# Patient Record
Sex: MALE | Race: OTHER | NOT HISPANIC OR LATINO
[De-identification: names, ages, dates, MRNs, and addresses within clinical notes are randomized per-mention and may not be internally consistent; named-entity substitution may affect disease eponyms.]

---

## 2020-04-26 ENCOUNTER — MESSAGE (OUTPATIENT)
Age: 50
End: 2020-04-26

## 2020-05-18 ENCOUNTER — APPOINTMENT (OUTPATIENT)
Dept: OPHTHALMOLOGY | Facility: CLINIC | Age: 50
End: 2020-05-18

## 2020-08-21 ENCOUNTER — EMERGENCY (EMERGENCY)
Facility: HOSPITAL | Age: 50
LOS: 1 days | Discharge: HOME | End: 2020-08-21
Attending: EMERGENCY MEDICINE
Payer: COMMERCIAL

## 2020-08-21 VITALS
HEART RATE: 76 BPM | OXYGEN SATURATION: 98 % | SYSTOLIC BLOOD PRESSURE: 132 MMHG | TEMPERATURE: 99 F | RESPIRATION RATE: 16 BRPM | DIASTOLIC BLOOD PRESSURE: 82 MMHG

## 2020-08-21 VITALS
HEART RATE: 118 BPM | TEMPERATURE: 99 F | SYSTOLIC BLOOD PRESSURE: 168 MMHG | WEIGHT: 192.02 LBS | HEIGHT: 70 IN | RESPIRATION RATE: 20 BRPM | DIASTOLIC BLOOD PRESSURE: 95 MMHG | OXYGEN SATURATION: 97 %

## 2020-08-21 LAB
ALBUMIN SERPL ELPH-MCNC: 4.4 G/DL — SIGNIFICANT CHANGE UP (ref 3.3–5)
ALP SERPL-CCNC: 91 U/L — SIGNIFICANT CHANGE UP (ref 40–120)
ALT FLD-CCNC: 36 U/L — SIGNIFICANT CHANGE UP (ref 10–45)
ANION GAP SERPL CALC-SCNC: 13 MMOL/L — SIGNIFICANT CHANGE UP (ref 5–17)
APPEARANCE UR: CLEAR — SIGNIFICANT CHANGE UP
APTT BLD: 34.8 SEC — SIGNIFICANT CHANGE UP (ref 27.5–35.5)
AST SERPL-CCNC: 26 U/L — SIGNIFICANT CHANGE UP (ref 10–40)
BACTERIA # UR AUTO: NEGATIVE — SIGNIFICANT CHANGE UP
BASOPHILS # BLD AUTO: 0.03 K/UL — SIGNIFICANT CHANGE UP (ref 0–0.2)
BASOPHILS NFR BLD AUTO: 0.3 % — SIGNIFICANT CHANGE UP (ref 0–2)
BILIRUB SERPL-MCNC: 0.6 MG/DL — SIGNIFICANT CHANGE UP (ref 0.2–1.2)
BILIRUB UR-MCNC: NEGATIVE — SIGNIFICANT CHANGE UP
BUN SERPL-MCNC: 12 MG/DL — SIGNIFICANT CHANGE UP (ref 7–23)
CALCIUM SERPL-MCNC: 10.1 MG/DL — SIGNIFICANT CHANGE UP (ref 8.4–10.5)
CHLORIDE SERPL-SCNC: 98 MMOL/L — SIGNIFICANT CHANGE UP (ref 96–108)
CO2 SERPL-SCNC: 25 MMOL/L — SIGNIFICANT CHANGE UP (ref 22–31)
COLOR SPEC: COLORLESS — SIGNIFICANT CHANGE UP
CREAT SERPL-MCNC: 1.01 MG/DL — SIGNIFICANT CHANGE UP (ref 0.5–1.3)
DIFF PNL FLD: NEGATIVE — SIGNIFICANT CHANGE UP
EOSINOPHIL # BLD AUTO: 0.02 K/UL — SIGNIFICANT CHANGE UP (ref 0–0.5)
EOSINOPHIL NFR BLD AUTO: 0.2 % — SIGNIFICANT CHANGE UP (ref 0–6)
EPI CELLS # UR: 0 /HPF — SIGNIFICANT CHANGE UP
GAS PNL BLDV: SIGNIFICANT CHANGE UP
GLUCOSE SERPL-MCNC: 184 MG/DL — HIGH (ref 70–99)
GLUCOSE UR QL: NEGATIVE — SIGNIFICANT CHANGE UP
HCT VFR BLD CALC: 41 % — SIGNIFICANT CHANGE UP (ref 39–50)
HGB BLD-MCNC: 13.1 G/DL — SIGNIFICANT CHANGE UP (ref 13–17)
IMM GRANULOCYTES NFR BLD AUTO: 0.8 % — SIGNIFICANT CHANGE UP (ref 0–1.5)
INR BLD: 1.1 RATIO — SIGNIFICANT CHANGE UP (ref 0.88–1.16)
KETONES UR-MCNC: NEGATIVE — SIGNIFICANT CHANGE UP
LEUKOCYTE ESTERASE UR-ACNC: NEGATIVE — SIGNIFICANT CHANGE UP
LIDOCAIN IGE QN: 35 U/L — SIGNIFICANT CHANGE UP (ref 7–60)
LYMPHOCYTES # BLD AUTO: 1.75 K/UL — SIGNIFICANT CHANGE UP (ref 1–3.3)
LYMPHOCYTES # BLD AUTO: 17 % — SIGNIFICANT CHANGE UP (ref 13–44)
MAGNESIUM SERPL-MCNC: 2.3 MG/DL — SIGNIFICANT CHANGE UP (ref 1.6–2.6)
MCHC RBC-ENTMCNC: 25.6 PG — LOW (ref 27–34)
MCHC RBC-ENTMCNC: 32 GM/DL — SIGNIFICANT CHANGE UP (ref 32–36)
MCV RBC AUTO: 80.2 FL — SIGNIFICANT CHANGE UP (ref 80–100)
MONOCYTES # BLD AUTO: 1.04 K/UL — HIGH (ref 0–0.9)
MONOCYTES NFR BLD AUTO: 10.1 % — SIGNIFICANT CHANGE UP (ref 2–14)
NEUTROPHILS # BLD AUTO: 7.38 K/UL — SIGNIFICANT CHANGE UP (ref 1.8–7.4)
NEUTROPHILS NFR BLD AUTO: 71.6 % — SIGNIFICANT CHANGE UP (ref 43–77)
NITRITE UR-MCNC: NEGATIVE — SIGNIFICANT CHANGE UP
NRBC # BLD: 0 /100 WBCS — SIGNIFICANT CHANGE UP (ref 0–0)
OB PNL STL: NEGATIVE — SIGNIFICANT CHANGE UP
PH UR: 7 — SIGNIFICANT CHANGE UP (ref 5–8)
PLATELET # BLD AUTO: 344 K/UL — SIGNIFICANT CHANGE UP (ref 150–400)
POTASSIUM SERPL-MCNC: 4.1 MMOL/L — SIGNIFICANT CHANGE UP (ref 3.5–5.3)
POTASSIUM SERPL-SCNC: 4.1 MMOL/L — SIGNIFICANT CHANGE UP (ref 3.5–5.3)
PROT SERPL-MCNC: 8.4 G/DL — HIGH (ref 6–8.3)
PROT UR-MCNC: NEGATIVE — SIGNIFICANT CHANGE UP
PROTHROM AB SERPL-ACNC: 13 SEC — SIGNIFICANT CHANGE UP (ref 10.6–13.6)
RBC # BLD: 5.11 M/UL — SIGNIFICANT CHANGE UP (ref 4.2–5.8)
RBC # FLD: 13.5 % — SIGNIFICANT CHANGE UP (ref 10.3–14.5)
RBC CASTS # UR COMP ASSIST: 2 /HPF — SIGNIFICANT CHANGE UP (ref 0–4)
SODIUM SERPL-SCNC: 136 MMOL/L — SIGNIFICANT CHANGE UP (ref 135–145)
SP GR SPEC: 1.03 — HIGH (ref 1.01–1.02)
TROPONIN T, HIGH SENSITIVITY RESULT: <6 NG/L — SIGNIFICANT CHANGE UP (ref 0–51)
UROBILINOGEN FLD QL: NEGATIVE — SIGNIFICANT CHANGE UP
WBC # BLD: 10.3 K/UL — SIGNIFICANT CHANGE UP (ref 3.8–10.5)
WBC # FLD AUTO: 10.3 K/UL — SIGNIFICANT CHANGE UP (ref 3.8–10.5)
WBC UR QL: 0 /HPF — SIGNIFICANT CHANGE UP (ref 0–5)

## 2020-08-21 PROCEDURE — 82435 ASSAY OF BLOOD CHLORIDE: CPT

## 2020-08-21 PROCEDURE — 99284 EMERGENCY DEPT VISIT MOD MDM: CPT | Mod: 25

## 2020-08-21 PROCEDURE — 87086 URINE CULTURE/COLONY COUNT: CPT

## 2020-08-21 PROCEDURE — 80053 COMPREHEN METABOLIC PANEL: CPT

## 2020-08-21 PROCEDURE — 84484 ASSAY OF TROPONIN QUANT: CPT

## 2020-08-21 PROCEDURE — 99285 EMERGENCY DEPT VISIT HI MDM: CPT

## 2020-08-21 PROCEDURE — 84132 ASSAY OF SERUM POTASSIUM: CPT

## 2020-08-21 PROCEDURE — 83735 ASSAY OF MAGNESIUM: CPT

## 2020-08-21 PROCEDURE — 83605 ASSAY OF LACTIC ACID: CPT

## 2020-08-21 PROCEDURE — 85027 COMPLETE CBC AUTOMATED: CPT

## 2020-08-21 PROCEDURE — 83690 ASSAY OF LIPASE: CPT

## 2020-08-21 PROCEDURE — 81001 URINALYSIS AUTO W/SCOPE: CPT

## 2020-08-21 PROCEDURE — 84295 ASSAY OF SERUM SODIUM: CPT

## 2020-08-21 PROCEDURE — 85730 THROMBOPLASTIN TIME PARTIAL: CPT

## 2020-08-21 PROCEDURE — 82330 ASSAY OF CALCIUM: CPT

## 2020-08-21 PROCEDURE — 93005 ELECTROCARDIOGRAM TRACING: CPT

## 2020-08-21 PROCEDURE — 82272 OCCULT BLD FECES 1-3 TESTS: CPT

## 2020-08-21 PROCEDURE — 93010 ELECTROCARDIOGRAM REPORT: CPT

## 2020-08-21 PROCEDURE — 74177 CT ABD & PELVIS W/CONTRAST: CPT

## 2020-08-21 PROCEDURE — 74177 CT ABD & PELVIS W/CONTRAST: CPT | Mod: 26

## 2020-08-21 PROCEDURE — 85610 PROTHROMBIN TIME: CPT

## 2020-08-21 PROCEDURE — 71046 X-RAY EXAM CHEST 2 VIEWS: CPT

## 2020-08-21 PROCEDURE — 85014 HEMATOCRIT: CPT

## 2020-08-21 PROCEDURE — 82803 BLOOD GASES ANY COMBINATION: CPT

## 2020-08-21 PROCEDURE — 82947 ASSAY GLUCOSE BLOOD QUANT: CPT

## 2020-08-21 PROCEDURE — 36415 COLL VENOUS BLD VENIPUNCTURE: CPT

## 2020-08-21 PROCEDURE — 71046 X-RAY EXAM CHEST 2 VIEWS: CPT | Mod: 26

## 2020-08-21 RX ORDER — LOSARTAN POTASSIUM 100 MG/1
100 TABLET, FILM COATED ORAL ONCE
Refills: 0 | Status: COMPLETED | OUTPATIENT
Start: 2020-08-21 | End: 2020-08-21

## 2020-08-21 RX ORDER — HYDROCHLOROTHIAZIDE 25 MG
12.5 TABLET ORAL ONCE
Refills: 0 | Status: COMPLETED | OUTPATIENT
Start: 2020-08-21 | End: 2020-08-21

## 2020-08-21 RX ORDER — ACETAMINOPHEN 500 MG
650 TABLET ORAL ONCE
Refills: 0 | Status: COMPLETED | OUTPATIENT
Start: 2020-08-21 | End: 2020-08-21

## 2020-08-21 RX ORDER — METOPROLOL TARTRATE 50 MG
100 TABLET ORAL ONCE
Refills: 0 | Status: COMPLETED | OUTPATIENT
Start: 2020-08-21 | End: 2020-08-21

## 2020-08-21 RX ORDER — SODIUM CHLORIDE 9 MG/ML
1000 INJECTION INTRAMUSCULAR; INTRAVENOUS; SUBCUTANEOUS ONCE
Refills: 0 | Status: COMPLETED | OUTPATIENT
Start: 2020-08-21 | End: 2020-08-21

## 2020-08-21 RX ADMIN — Medication 100 MILLIGRAM(S): at 18:00

## 2020-08-21 RX ADMIN — Medication 10 MILLIGRAM(S): at 16:41

## 2020-08-21 RX ADMIN — SODIUM CHLORIDE 1000 MILLILITER(S): 9 INJECTION INTRAMUSCULAR; INTRAVENOUS; SUBCUTANEOUS at 18:00

## 2020-08-21 RX ADMIN — Medication 12.5 MILLIGRAM(S): at 16:41

## 2020-08-21 RX ADMIN — LOSARTAN POTASSIUM 100 MILLIGRAM(S): 100 TABLET, FILM COATED ORAL at 16:41

## 2020-08-21 RX ADMIN — Medication 650 MILLIGRAM(S): at 19:50

## 2020-08-21 NOTE — ED PROVIDER NOTE - PATIENT PORTAL LINK FT
You can access the FollowMyHealth Patient Portal offered by [facility] by registering at the following website: [email or web address]. By joining [facility]’s FollowMyHealth portal, you will also be able to view your health information using other applications (apps) compatible with our system.

## 2020-08-21 NOTE — ED ADULT NURSE NOTE - PAIN RATING/NUMBER SCALE (0-10): REST
12:57 pm, TN contacted by Dr Christian, SW Consult placed for Hospice care. Per Dr Christian, he has already contacted Alisia with [facility].     1:35pm, TN contacted by Alisia with [facility] [phone]. Per Alisia, they do not service the [place] area. Pt informed both she and Dr Christian that her  is currently under the care of [facility] and she would like them as well.     2:10 pm, TN spoke with [facility] on call, Aye [phone], fax [phone] (cell [phone]). She will reach out to patient and call TN back. Once she has determined what equipment will be needed, they will try to make arrangements to deliver the equipment to pt's brother's home [address] [place] [address], [place], [facility] where pt is discharging to.    2:38 pm, TN received call fromwith [facility] on call, Aye. She has spoken with the patient. Pt wishes to d/c tonight if possible. Pt currently listed a Full code, per Dr Christian, he will change her to DNR.     5

## 2020-08-21 NOTE — ED PROVIDER NOTE - PROGRESS NOTE DETAILS
Reevaluated patient- he is feeling well. Not having abdominal pain. Discussed work up of CT. Patient with ureteral stone, mild inflammatory changes. Advised to stay hydrated. Will PO challenge and DC home to f/u with primary Patient tolerating PO. Ambulatory in ED. Will d/c home to f/u with PCP. All questions answered regarding plan R ureteral stone, no infected UA, no hydronephrosis, pain/nausea controlled, tolerating PO, benign repeat abd exams, nml VS at dc, in depth dw pt about ddx, tx, martínez, continued close outpt fu. -- Nav Garcia MD

## 2020-08-21 NOTE — ED ADULT NURSE NOTE - OBJECTIVE STATEMENT
50 year old male Hx of HTN presents to the ED complaining of abdominal pain and distension over the last few days. Pt is A&O x 4, VSS, afebrile, ambulating independently. Pt denies fever, chills, NVD, SOB, or chest pain. Pt complains of a reduced appetite. Pt is tachycardic to 113 and hypertensive to 160s over 102 diastolic, pt states that he has not taken his BP medications, Pt states he took peptobismo and has dark stools. IV placed, labs drawn, bed in low position, safety measures in place. 18G placed in right AC.

## 2020-08-21 NOTE — ED PROVIDER NOTE - NSFOLLOWUPINSTRUCTIONS_ED_ALL_ED_FT
1) Follow up with your doctor within 1-3 days for the symptoms you are experiencing  2) Return to the ED immediately for new or worsening symptoms unable to eat or drink, vomiting, severe pain, bloody or dark stools, blood in urine, chest pain, shortness of breath   3) Please continue to take any home medications as prescribed  4) Your test results from your ED visit were discussed with you prior to discharge  5) You were provided with a copy of your test results  6) The kidney stones are small enough that they should pass on their own. Make sure to drink 8-10 glasses of water a day and to stay hydrated to help with the passing. 1) Follow up with your doctor within 1-3 days for the symptoms you are experiencing  2) Return to the ED immediately for new or worsening symptoms unable to eat or drink, vomiting, severe pain, bloody or dark stools, blood in urine, chest pain, shortness of breath   3) Please continue to take any home medications as prescribed  4) Your test results from your ED visit were discussed with you prior to discharge  5) You were provided with a copy of your test results  6) The kidney stones are small enough that they should pass on their own. Make sure to drink 8-10 glasses of water a day and to stay hydrated to help with the passing.  7) You should follow up with a urologist for the stones. A number has been provided for you to call. The call center should also contact you with follow up information.

## 2020-08-21 NOTE — ED PROVIDER NOTE - CARE PLAN
Principal Discharge DX:	Lower abdominal pain  Secondary Diagnosis:	Ureteral stone Principal Discharge DX:	Ureteral colic  Secondary Diagnosis:	Ureteral stone  Secondary Diagnosis:	Undifferentiated abdominal pain

## 2020-08-21 NOTE — ED PROVIDER NOTE - ATTENDING CONTRIBUTION TO CARE
49 yo male p/w numerous somatic complaints including resolved L arm pain, chills, decreased appetite, dark stools, bilateral lower abdominal pain.  pain free on my assessment.  unclear etiology -- will trend enzymes r/o NSTEMI, CT abdomen/pelvis eval for divertic vs renal colic and reassess.

## 2020-08-21 NOTE — ED ADULT TRIAGE NOTE - CHIEF COMPLAINT QUOTE
abdominal bloating, feeling tight in the stomach, decrease appetite for a week, dark stool this morning

## 2020-08-21 NOTE — ED PROVIDER NOTE - NSFOLLOWUPCLINICS_GEN_ALL_ED_FT
[facility]  Urology  [address] [place] [address]  Phone: [phone]  Fax:   Follow Up Time: 1-3 Days

## 2020-08-21 NOTE — ED PROVIDER NOTE - OBJECTIVE STATEMENT
49 y/o male PMHx HTN, HLD, DM on metformin no PSX non smoker, no ETOH presented to the ED c/o abdominal distension/bloating, lack of appetite, dark stools and generalized weakness x1 week. Patient reported the symptoms after he had LUE swelling, pain and fever 100.2 orally one week ago that since has resolved. Then patient reported lower abdominal pain particularly right before he eats and does not have appetite. Patient stated the pain is described to be cramping. Patient took pepto bismol yesterday and today in which he then noticed his stool was black. Has never had black stools and has never had C-scope. Patient stated he works as a [employer] and had to call out of work today due to his general weakness. Denied h/o COVID, cough, CP, SOB, N/V/D, family h/o CAD, ever having stress test, smoking, ETOH, NSAID use, hematochezia, syncope, palpitations 49 y/o male PMHx HTN, HLD, DM on metformin no PSX non smoker, no ETOH presented to the ED c/o abdominal distension/bloating, lack of appetite, dark stools and generalized weakness x1 week. Patient reported the symptoms after he had LUE swelling, pain and fever 100.2 orally one week ago that since has resolved. Then patient reported lower abdominal pain particularly right before he eats and does not have appetite. Patient stated the pain is described to be cramping. Patient took pepto bismol yesterday and today in which he then noticed his stool was black. Has never had black stools and has never had C-scope. Patient stated he works as a [employer] and had to call out of work today due to his general weakness. Denied h/o COVID, cough, CP, SOB, N/V/D, family h/o CAD, ever having stress test, smoking, ETOH, NSAID use, hematochezia, syncope, palpitations. Patient has not taken his losartan 100/HCTZ 12.5mg and metop 100mg since two days ago due to his generalized weakness

## 2020-08-22 LAB
CULTURE RESULTS: NO GROWTH — SIGNIFICANT CHANGE UP
SPECIMEN SOURCE: SIGNIFICANT CHANGE UP

## 2020-08-25 PROBLEM — Z00.00 ENCOUNTER FOR PREVENTIVE HEALTH EXAMINATION: Status: ACTIVE | Noted: 2020-08-25

## 2020-08-25 PROBLEM — E11.9 TYPE 2 DIABETES MELLITUS WITHOUT COMPLICATIONS: Chronic | Status: ACTIVE | Noted: 2020-08-21

## 2020-08-25 PROBLEM — I10 ESSENTIAL (PRIMARY) HYPERTENSION: Chronic | Status: ACTIVE | Noted: 2020-08-21

## 2020-08-25 PROBLEM — E78.5 HYPERLIPIDEMIA, UNSPECIFIED: Chronic | Status: ACTIVE | Noted: 2020-08-21

## 2020-09-04 ENCOUNTER — APPOINTMENT (OUTPATIENT)
Dept: UROLOGY | Facility: CLINIC | Age: 50
End: 2020-09-04
Payer: COMMERCIAL

## 2020-09-04 VITALS — TEMPERATURE: 98.3 F

## 2020-09-04 DIAGNOSIS — E11.9 TYPE 2 DIABETES MELLITUS W/OUT COMPLICATIONS: ICD-10-CM

## 2020-09-04 DIAGNOSIS — I10 ESSENTIAL (PRIMARY) HYPERTENSION: ICD-10-CM

## 2020-09-04 DIAGNOSIS — Z78.9 OTHER SPECIFIED HEALTH STATUS: ICD-10-CM

## 2020-09-04 PROCEDURE — 99203 OFFICE O/P NEW LOW 30 MIN: CPT

## 2020-09-08 PROBLEM — I10 HYPERTENSION: Status: ACTIVE | Noted: 2020-09-08

## 2020-09-08 PROBLEM — Z78.9 NON-SMOKER: Status: ACTIVE | Noted: 2020-09-08

## 2020-09-08 PROBLEM — E11.9 TYPE 2 DIABETES MELLITUS WITHOUT COMPLICATION, WITHOUT LONG-TERM CURRENT USE OF INSULIN: Status: ACTIVE | Noted: 2020-09-08

## 2020-09-08 RX ORDER — METFORMIN HYDROCHLORIDE 500 MG/1
500 TABLET, COATED ORAL
Refills: 0 | Status: ACTIVE | COMMUNITY

## 2020-09-08 RX ORDER — LOSARTAN POTASSIUM AND HYDROCHLOROTHIAZIDE 100; 25 MG/1; MG/1
100-25 TABLET, FILM COATED ORAL
Refills: 0 | Status: ACTIVE | COMMUNITY

## 2020-09-08 RX ORDER — METOPROLOL SUCCINATE 100 MG/1
TABLET, EXTENDED RELEASE ORAL
Refills: 0 | Status: ACTIVE | COMMUNITY

## 2020-09-08 RX ORDER — ASPIRIN 81 MG
81 TABLET, DELAYED RELEASE (ENTERIC COATED) ORAL
Refills: 0 | Status: ACTIVE | COMMUNITY

## 2020-09-08 RX ORDER — ATORVASTATIN CALCIUM 20 MG/1
20 TABLET, FILM COATED ORAL
Refills: 0 | Status: ACTIVE | COMMUNITY

## 2020-09-08 NOTE — ASSESSMENT
[FreeTextEntry1] : discussed indications for intervention - none of which are in lay; in fact may have passed.\par will get ULS i na month; the other stones too small for intervention at this time\par

## 2020-09-08 NOTE — HISTORY OF PRESENT ILLNESS
[FreeTextEntry1] : Patient present s for management of a right ureteral stone\par He had been experiencing lower abdominal discomfort - nothing severe or radiating and no associated nausea or vomiting. No association with voiding. He had a CT scan noting a 3mm right mid-ureteral stone with no hydronephrosis. He also has some smaller stones in same kidney. He has had no more discomfort for past 2 weeks.\par No prior h/o stones

## 2020-09-08 NOTE — PHYSICAL EXAM
[General Appearance - Well Developed] : well developed [General Appearance - Well Nourished] : well nourished [Edema] : no peripheral edema [Normal Station and Gait] : the gait and station were normal for the patient's age [Costovertebral Angle Tenderness] : no ~M costovertebral angle tenderness [Exaggerated Use Of Accessory Muscles For Inspiration] : no accessory muscle use [] : no rash [No Focal Deficits] : no focal deficits [Oriented To Time, Place, And Person] : oriented to person, place, and time

## 2020-10-13 ENCOUNTER — OUTPATIENT (OUTPATIENT)
Dept: OUTPATIENT SERVICES | Facility: HOSPITAL | Age: 50
LOS: 1 days | End: 2020-10-13
Payer: COMMERCIAL

## 2020-10-13 ENCOUNTER — APPOINTMENT (OUTPATIENT)
Dept: UROLOGY | Facility: CLINIC | Age: 50
End: 2020-10-13

## 2020-10-13 ENCOUNTER — APPOINTMENT (OUTPATIENT)
Dept: UROLOGY | Facility: CLINIC | Age: 50
End: 2020-10-13
Payer: COMMERCIAL

## 2020-10-13 VITALS — TEMPERATURE: 97.6 F

## 2020-10-13 DIAGNOSIS — R35.0 FREQUENCY OF MICTURITION: ICD-10-CM

## 2020-10-13 DIAGNOSIS — N20.1 CALCULUS OF URETER: ICD-10-CM

## 2020-10-13 PROCEDURE — 76775 US EXAM ABDO BACK WALL LIM: CPT | Mod: 26

## 2020-10-13 PROCEDURE — 76775 US EXAM ABDO BACK WALL LIM: CPT

## 2020-10-13 NOTE — HISTORY OF PRESENT ILLNESS
[FreeTextEntry1] : Patient present s for management of a right ureteral stone\par He had been experiencing lower abdominal discomfort - nothing severe or radiating and no associated nausea or vomiting. No association with voiding. He had a CT scan noting a 3mm right mid-ureteral stone with no hydronephrosis. He also has some smaller stones in same kidney. He has had no more discomfort for past 2 weeks. No prior h/o stones \par \par in f/u as before no more pain.\par ULS today with no stone or hydro

## 2020-10-13 NOTE — ASSESSMENT
[FreeTextEntry1] : most likely passed but given no hydro or pain see no reason for CT to prove.\par if has more pain call\par else drink more water and f/u PRN

## 2020-10-19 DIAGNOSIS — N20.1 CALCULUS OF URETER: ICD-10-CM

## 2021-03-16 ENCOUNTER — NON-APPOINTMENT (OUTPATIENT)
Age: 51
End: 2021-03-16

## 2021-03-16 ENCOUNTER — APPOINTMENT (OUTPATIENT)
Dept: OPHTHALMOLOGY | Facility: CLINIC | Age: 51
End: 2021-03-16
Payer: COMMERCIAL

## 2021-03-16 PROCEDURE — 99072 ADDL SUPL MATRL&STAF TM PHE: CPT

## 2021-03-16 PROCEDURE — 92202 OPSCPY EXTND ON/MAC DRAW: CPT

## 2021-03-16 PROCEDURE — 92014 COMPRE OPH EXAM EST PT 1/>: CPT

## 2021-03-16 PROCEDURE — 92134 CPTRZ OPH DX IMG PST SGM RTA: CPT

## 2021-09-17 ENCOUNTER — APPOINTMENT (OUTPATIENT)
Dept: OPHTHALMOLOGY | Facility: CLINIC | Age: 51
End: 2021-09-17

## 2021-10-15 ENCOUNTER — TRANSCRIPTION ENCOUNTER (OUTPATIENT)
Age: 51
End: 2021-10-15

## 2021-10-15 ENCOUNTER — INPATIENT (INPATIENT)
Facility: HOSPITAL | Age: 51
LOS: 5 days | Discharge: HOME HEALTH | DRG: 853 | End: 2021-10-21
Attending: INTERNAL MEDICINE | Admitting: INTERNAL MEDICINE
Payer: COMMERCIAL

## 2021-10-15 VITALS
TEMPERATURE: 101 F | OXYGEN SATURATION: 95 % | SYSTOLIC BLOOD PRESSURE: 175 MMHG | RESPIRATION RATE: 18 BRPM | HEART RATE: 104 BPM | DIASTOLIC BLOOD PRESSURE: 81 MMHG | HEIGHT: 70 IN | WEIGHT: 192.02 LBS

## 2021-10-15 LAB
ALBUMIN SERPL ELPH-MCNC: 3.6 G/DL — SIGNIFICANT CHANGE UP (ref 3.3–5)
ALBUMIN SERPL ELPH-MCNC: 3.9 G/DL — SIGNIFICANT CHANGE UP (ref 3.3–5)
ALP SERPL-CCNC: 126 U/L — HIGH (ref 40–120)
ALP SERPL-CCNC: 130 U/L — HIGH (ref 40–120)
ALT FLD-CCNC: 59 U/L — HIGH (ref 10–45)
ALT FLD-CCNC: 64 U/L — HIGH (ref 10–45)
ANION GAP SERPL CALC-SCNC: 12 MMOL/L — SIGNIFICANT CHANGE UP (ref 5–17)
ANION GAP SERPL CALC-SCNC: 12 MMOL/L — SIGNIFICANT CHANGE UP (ref 5–17)
ANION GAP SERPL CALC-SCNC: 15 MMOL/L — SIGNIFICANT CHANGE UP (ref 5–17)
APPEARANCE UR: CLEAR — SIGNIFICANT CHANGE UP
APTT BLD: 30.3 SEC — SIGNIFICANT CHANGE UP (ref 27.5–35.5)
AST SERPL-CCNC: 36 U/L — SIGNIFICANT CHANGE UP (ref 10–40)
AST SERPL-CCNC: 40 U/L — SIGNIFICANT CHANGE UP (ref 10–40)
BACTERIA # UR AUTO: NEGATIVE — SIGNIFICANT CHANGE UP
BASE EXCESS BLDV CALC-SCNC: 3.3 MMOL/L — HIGH (ref -2–2)
BASE EXCESS BLDV CALC-SCNC: 4.6 MMOL/L — HIGH (ref -2–2)
BASOPHILS # BLD AUTO: 0.03 K/UL — SIGNIFICANT CHANGE UP (ref 0–0.2)
BASOPHILS # BLD AUTO: 0.03 K/UL — SIGNIFICANT CHANGE UP (ref 0–0.2)
BASOPHILS NFR BLD AUTO: 0.2 % — SIGNIFICANT CHANGE UP (ref 0–2)
BASOPHILS NFR BLD AUTO: 0.3 % — SIGNIFICANT CHANGE UP (ref 0–2)
BILIRUB SERPL-MCNC: 0.7 MG/DL — SIGNIFICANT CHANGE UP (ref 0.2–1.2)
BILIRUB SERPL-MCNC: 0.7 MG/DL — SIGNIFICANT CHANGE UP (ref 0.2–1.2)
BILIRUB UR-MCNC: NEGATIVE — SIGNIFICANT CHANGE UP
BUN SERPL-MCNC: 13 MG/DL — SIGNIFICANT CHANGE UP (ref 7–23)
BUN SERPL-MCNC: 13 MG/DL — SIGNIFICANT CHANGE UP (ref 7–23)
BUN SERPL-MCNC: 16 MG/DL — SIGNIFICANT CHANGE UP (ref 7–23)
CA-I SERPL-SCNC: 1.12 MMOL/L — LOW (ref 1.15–1.33)
CA-I SERPL-SCNC: 1.17 MMOL/L — SIGNIFICANT CHANGE UP (ref 1.15–1.33)
CALCIUM SERPL-MCNC: 8.2 MG/DL — LOW (ref 8.4–10.5)
CALCIUM SERPL-MCNC: 8.7 MG/DL — SIGNIFICANT CHANGE UP (ref 8.4–10.5)
CALCIUM SERPL-MCNC: 9.5 MG/DL — SIGNIFICANT CHANGE UP (ref 8.4–10.5)
CHLORIDE BLDV-SCNC: 95 MMOL/L — LOW (ref 96–108)
CHLORIDE BLDV-SCNC: 98 MMOL/L — SIGNIFICANT CHANGE UP (ref 96–108)
CHLORIDE SERPL-SCNC: 100 MMOL/L — SIGNIFICANT CHANGE UP (ref 96–108)
CHLORIDE SERPL-SCNC: 92 MMOL/L — LOW (ref 96–108)
CHLORIDE SERPL-SCNC: 96 MMOL/L — SIGNIFICANT CHANGE UP (ref 96–108)
CO2 BLDV-SCNC: 29 MMOL/L — HIGH (ref 22–26)
CO2 BLDV-SCNC: 32 MMOL/L — HIGH (ref 22–26)
CO2 SERPL-SCNC: 22 MMOL/L — SIGNIFICANT CHANGE UP (ref 22–31)
CO2 SERPL-SCNC: 23 MMOL/L — SIGNIFICANT CHANGE UP (ref 22–31)
CO2 SERPL-SCNC: 23 MMOL/L — SIGNIFICANT CHANGE UP (ref 22–31)
COLOR SPEC: SIGNIFICANT CHANGE UP
CREAT SERPL-MCNC: 0.78 MG/DL — SIGNIFICANT CHANGE UP (ref 0.5–1.3)
CREAT SERPL-MCNC: 0.83 MG/DL — SIGNIFICANT CHANGE UP (ref 0.5–1.3)
CREAT SERPL-MCNC: 0.94 MG/DL — SIGNIFICANT CHANGE UP (ref 0.5–1.3)
DIFF PNL FLD: ABNORMAL
EOSINOPHIL # BLD AUTO: 0.03 K/UL — SIGNIFICANT CHANGE UP (ref 0–0.5)
EOSINOPHIL # BLD AUTO: 0.05 K/UL — SIGNIFICANT CHANGE UP (ref 0–0.5)
EOSINOPHIL NFR BLD AUTO: 0.2 % — SIGNIFICANT CHANGE UP (ref 0–6)
EOSINOPHIL NFR BLD AUTO: 0.4 % — SIGNIFICANT CHANGE UP (ref 0–6)
EPI CELLS # UR: 1 /HPF — SIGNIFICANT CHANGE UP
GAS PNL BLDV: 128 MMOL/L — LOW (ref 136–145)
GAS PNL BLDV: 129 MMOL/L — LOW (ref 136–145)
GAS PNL BLDV: SIGNIFICANT CHANGE UP
GLUCOSE BLDC GLUCOMTR-MCNC: 236 MG/DL — HIGH (ref 70–99)
GLUCOSE BLDV-MCNC: 251 MG/DL — HIGH (ref 70–99)
GLUCOSE BLDV-MCNC: 329 MG/DL — HIGH (ref 70–99)
GLUCOSE SERPL-MCNC: 216 MG/DL — HIGH (ref 70–99)
GLUCOSE SERPL-MCNC: 239 MG/DL — HIGH (ref 70–99)
GLUCOSE SERPL-MCNC: 274 MG/DL — HIGH (ref 70–99)
GLUCOSE UR QL: ABNORMAL
HCO3 BLDV-SCNC: 28 MMOL/L — SIGNIFICANT CHANGE UP (ref 22–29)
HCO3 BLDV-SCNC: 30 MMOL/L — HIGH (ref 22–29)
HCT VFR BLD CALC: 34.2 % — LOW (ref 39–50)
HCT VFR BLD CALC: 36.9 % — LOW (ref 39–50)
HCT VFR BLDA CALC: 35 % — LOW (ref 39–51)
HCT VFR BLDA CALC: 37 % — LOW (ref 39–51)
HGB BLD CALC-MCNC: 11.7 G/DL — LOW (ref 12.6–17.4)
HGB BLD CALC-MCNC: 12.3 G/DL — LOW (ref 12.6–17.4)
HGB BLD-MCNC: 11.1 G/DL — LOW (ref 13–17)
HGB BLD-MCNC: 11.7 G/DL — LOW (ref 13–17)
HYALINE CASTS # UR AUTO: 0 /LPF — SIGNIFICANT CHANGE UP (ref 0–2)
IMM GRANULOCYTES NFR BLD AUTO: 1 % — SIGNIFICANT CHANGE UP (ref 0–1.5)
IMM GRANULOCYTES NFR BLD AUTO: 1.1 % — SIGNIFICANT CHANGE UP (ref 0–1.5)
INR BLD: 1.09 RATIO — SIGNIFICANT CHANGE UP (ref 0.88–1.16)
KETONES UR-MCNC: NEGATIVE — SIGNIFICANT CHANGE UP
LACTATE BLDV-MCNC: 1.6 MMOL/L — SIGNIFICANT CHANGE UP (ref 0.7–2)
LACTATE BLDV-MCNC: 1.6 MMOL/L — SIGNIFICANT CHANGE UP (ref 0.7–2)
LEUKOCYTE ESTERASE UR-ACNC: NEGATIVE — SIGNIFICANT CHANGE UP
LYMPHOCYTES # BLD AUTO: 1.7 K/UL — SIGNIFICANT CHANGE UP (ref 1–3.3)
LYMPHOCYTES # BLD AUTO: 14.5 % — SIGNIFICANT CHANGE UP (ref 13–44)
LYMPHOCYTES # BLD AUTO: 16 % — SIGNIFICANT CHANGE UP (ref 13–44)
LYMPHOCYTES # BLD AUTO: 2 K/UL — SIGNIFICANT CHANGE UP (ref 1–3.3)
MCHC RBC-ENTMCNC: 25.4 PG — LOW (ref 27–34)
MCHC RBC-ENTMCNC: 25.6 PG — LOW (ref 27–34)
MCHC RBC-ENTMCNC: 31.7 GM/DL — LOW (ref 32–36)
MCHC RBC-ENTMCNC: 32.5 GM/DL — SIGNIFICANT CHANGE UP (ref 32–36)
MCV RBC AUTO: 78.8 FL — LOW (ref 80–100)
MCV RBC AUTO: 80 FL — SIGNIFICANT CHANGE UP (ref 80–100)
MONOCYTES # BLD AUTO: 1.19 K/UL — HIGH (ref 0–0.9)
MONOCYTES # BLD AUTO: 1.49 K/UL — HIGH (ref 0–0.9)
MONOCYTES NFR BLD AUTO: 10.2 % — SIGNIFICANT CHANGE UP (ref 2–14)
MONOCYTES NFR BLD AUTO: 11.9 % — SIGNIFICANT CHANGE UP (ref 2–14)
NEUTROPHILS # BLD AUTO: 8.6 K/UL — HIGH (ref 1.8–7.4)
NEUTROPHILS # BLD AUTO: 8.85 K/UL — HIGH (ref 1.8–7.4)
NEUTROPHILS NFR BLD AUTO: 70.7 % — SIGNIFICANT CHANGE UP (ref 43–77)
NEUTROPHILS NFR BLD AUTO: 73.5 % — SIGNIFICANT CHANGE UP (ref 43–77)
NITRITE UR-MCNC: NEGATIVE — SIGNIFICANT CHANGE UP
NRBC # BLD: 0 /100 WBCS — SIGNIFICANT CHANGE UP (ref 0–0)
NRBC # BLD: 0 /100 WBCS — SIGNIFICANT CHANGE UP (ref 0–0)
PCO2 BLDV: 41 MMHG — LOW (ref 42–55)
PCO2 BLDV: 49 MMHG — SIGNIFICANT CHANGE UP (ref 42–55)
PH BLDV: 7.4 — SIGNIFICANT CHANGE UP (ref 7.32–7.43)
PH BLDV: 7.44 — HIGH (ref 7.32–7.43)
PH UR: 6.5 — SIGNIFICANT CHANGE UP (ref 5–8)
PLATELET # BLD AUTO: 295 K/UL — SIGNIFICANT CHANGE UP (ref 150–400)
PLATELET # BLD AUTO: 310 K/UL — SIGNIFICANT CHANGE UP (ref 150–400)
PO2 BLDV: 27 MMHG — SIGNIFICANT CHANGE UP (ref 25–45)
PO2 BLDV: 39 MMHG — SIGNIFICANT CHANGE UP (ref 25–45)
POTASSIUM BLDV-SCNC: 4.2 MMOL/L — SIGNIFICANT CHANGE UP (ref 3.5–5.1)
POTASSIUM BLDV-SCNC: 5.7 MMOL/L — HIGH (ref 3.5–5.1)
POTASSIUM SERPL-MCNC: 4 MMOL/L — SIGNIFICANT CHANGE UP (ref 3.5–5.3)
POTASSIUM SERPL-MCNC: 4.4 MMOL/L — SIGNIFICANT CHANGE UP (ref 3.5–5.3)
POTASSIUM SERPL-MCNC: 4.6 MMOL/L — SIGNIFICANT CHANGE UP (ref 3.5–5.3)
POTASSIUM SERPL-SCNC: 4 MMOL/L — SIGNIFICANT CHANGE UP (ref 3.5–5.3)
POTASSIUM SERPL-SCNC: 4.4 MMOL/L — SIGNIFICANT CHANGE UP (ref 3.5–5.3)
POTASSIUM SERPL-SCNC: 4.6 MMOL/L — SIGNIFICANT CHANGE UP (ref 3.5–5.3)
PROT SERPL-MCNC: 7.1 G/DL — SIGNIFICANT CHANGE UP (ref 6–8.3)
PROT SERPL-MCNC: 7.9 G/DL — SIGNIFICANT CHANGE UP (ref 6–8.3)
PROT UR-MCNC: ABNORMAL
PROTHROM AB SERPL-ACNC: 13 SEC — SIGNIFICANT CHANGE UP (ref 10.6–13.6)
RAPID RVP RESULT: SIGNIFICANT CHANGE UP
RBC # BLD: 4.34 M/UL — SIGNIFICANT CHANGE UP (ref 4.2–5.8)
RBC # BLD: 4.61 M/UL — SIGNIFICANT CHANGE UP (ref 4.2–5.8)
RBC # FLD: 14.6 % — HIGH (ref 10.3–14.5)
RBC # FLD: 14.6 % — HIGH (ref 10.3–14.5)
RBC CASTS # UR COMP ASSIST: 48 /HPF — HIGH (ref 0–4)
SAO2 % BLDV: 46.4 % — LOW (ref 67–88)
SAO2 % BLDV: 72.5 % — SIGNIFICANT CHANGE UP (ref 67–88)
SARS-COV-2 RNA SPEC QL NAA+PROBE: SIGNIFICANT CHANGE UP
SODIUM SERPL-SCNC: 130 MMOL/L — LOW (ref 135–145)
SODIUM SERPL-SCNC: 131 MMOL/L — LOW (ref 135–145)
SODIUM SERPL-SCNC: 134 MMOL/L — LOW (ref 135–145)
SP GR SPEC: 1.02 — SIGNIFICANT CHANGE UP (ref 1.01–1.02)
UROBILINOGEN FLD QL: NEGATIVE — SIGNIFICANT CHANGE UP
WBC # BLD: 11.7 K/UL — HIGH (ref 3.8–10.5)
WBC # BLD: 12.53 K/UL — HIGH (ref 3.8–10.5)
WBC # FLD AUTO: 11.7 K/UL — HIGH (ref 3.8–10.5)
WBC # FLD AUTO: 12.53 K/UL — HIGH (ref 3.8–10.5)
WBC UR QL: 1 /HPF — SIGNIFICANT CHANGE UP (ref 0–5)

## 2021-10-15 PROCEDURE — 72158 MRI LUMBAR SPINE W/O & W/DYE: CPT | Mod: 26,MA

## 2021-10-15 PROCEDURE — 71045 X-RAY EXAM CHEST 1 VIEW: CPT | Mod: 26

## 2021-10-15 PROCEDURE — 74176 CT ABD & PELVIS W/O CONTRAST: CPT | Mod: 26,MA

## 2021-10-15 PROCEDURE — 99220: CPT

## 2021-10-15 RX ORDER — MORPHINE SULFATE 50 MG/1
4 CAPSULE, EXTENDED RELEASE ORAL ONCE
Refills: 0 | Status: DISCONTINUED | OUTPATIENT
Start: 2021-10-15 | End: 2021-10-15

## 2021-10-15 RX ORDER — HYDROCHLOROTHIAZIDE 25 MG
25 TABLET ORAL DAILY
Refills: 0 | Status: DISCONTINUED | OUTPATIENT
Start: 2021-10-15 | End: 2021-10-16

## 2021-10-15 RX ORDER — CEFTRIAXONE 500 MG/1
1000 INJECTION, POWDER, FOR SOLUTION INTRAMUSCULAR; INTRAVENOUS ONCE
Refills: 0 | Status: COMPLETED | OUTPATIENT
Start: 2021-10-15 | End: 2021-10-15

## 2021-10-15 RX ORDER — KETOROLAC TROMETHAMINE 30 MG/ML
15 SYRINGE (ML) INJECTION ONCE
Refills: 0 | Status: DISCONTINUED | OUTPATIENT
Start: 2021-10-15 | End: 2021-10-15

## 2021-10-15 RX ORDER — ACETAMINOPHEN 500 MG
975 TABLET ORAL ONCE
Refills: 0 | Status: COMPLETED | OUTPATIENT
Start: 2021-10-15 | End: 2021-10-15

## 2021-10-15 RX ORDER — SODIUM CHLORIDE 9 MG/ML
1000 INJECTION, SOLUTION INTRAVENOUS
Refills: 0 | Status: DISCONTINUED | OUTPATIENT
Start: 2021-10-15 | End: 2021-10-16

## 2021-10-15 RX ORDER — DEXTROSE 50 % IN WATER 50 %
25 SYRINGE (ML) INTRAVENOUS ONCE
Refills: 0 | Status: DISCONTINUED | OUTPATIENT
Start: 2021-10-15 | End: 2021-10-16

## 2021-10-15 RX ORDER — METOPROLOL TARTRATE 50 MG
100 TABLET ORAL DAILY
Refills: 0 | Status: DISCONTINUED | OUTPATIENT
Start: 2021-10-15 | End: 2021-10-16

## 2021-10-15 RX ORDER — ASPIRIN/CALCIUM CARB/MAGNESIUM 324 MG
81 TABLET ORAL DAILY
Refills: 0 | Status: DISCONTINUED | OUTPATIENT
Start: 2021-10-15 | End: 2021-10-16

## 2021-10-15 RX ORDER — SODIUM CHLORIDE 9 MG/ML
1000 INJECTION INTRAMUSCULAR; INTRAVENOUS; SUBCUTANEOUS
Refills: 0 | Status: DISCONTINUED | OUTPATIENT
Start: 2021-10-15 | End: 2021-10-16

## 2021-10-15 RX ORDER — LIDOCAINE 4 G/100G
1 CREAM TOPICAL ONCE
Refills: 0 | Status: COMPLETED | OUTPATIENT
Start: 2021-10-15 | End: 2021-10-15

## 2021-10-15 RX ORDER — ATORVASTATIN CALCIUM 80 MG/1
20 TABLET, FILM COATED ORAL AT BEDTIME
Refills: 0 | Status: DISCONTINUED | OUTPATIENT
Start: 2021-10-15 | End: 2021-10-16

## 2021-10-15 RX ORDER — GLUCAGON INJECTION, SOLUTION 0.5 MG/.1ML
1 INJECTION, SOLUTION SUBCUTANEOUS ONCE
Refills: 0 | Status: DISCONTINUED | OUTPATIENT
Start: 2021-10-15 | End: 2021-10-16

## 2021-10-15 RX ORDER — DEXTROSE 50 % IN WATER 50 %
12.5 SYRINGE (ML) INTRAVENOUS ONCE
Refills: 0 | Status: DISCONTINUED | OUTPATIENT
Start: 2021-10-15 | End: 2021-10-16

## 2021-10-15 RX ORDER — SODIUM CHLORIDE 9 MG/ML
1000 INJECTION INTRAMUSCULAR; INTRAVENOUS; SUBCUTANEOUS ONCE
Refills: 0 | Status: COMPLETED | OUTPATIENT
Start: 2021-10-15 | End: 2021-10-15

## 2021-10-15 RX ORDER — SODIUM CHLORIDE 9 MG/ML
2700 INJECTION INTRAMUSCULAR; INTRAVENOUS; SUBCUTANEOUS ONCE
Refills: 0 | Status: COMPLETED | OUTPATIENT
Start: 2021-10-15 | End: 2021-10-15

## 2021-10-15 RX ORDER — INSULIN LISPRO 100/ML
VIAL (ML) SUBCUTANEOUS
Refills: 0 | Status: DISCONTINUED | OUTPATIENT
Start: 2021-10-15 | End: 2021-10-16

## 2021-10-15 RX ORDER — INSULIN LISPRO 100/ML
VIAL (ML) SUBCUTANEOUS AT BEDTIME
Refills: 0 | Status: DISCONTINUED | OUTPATIENT
Start: 2021-10-15 | End: 2021-10-16

## 2021-10-15 RX ORDER — DIAZEPAM 5 MG
5 TABLET ORAL ONCE
Refills: 0 | Status: DISCONTINUED | OUTPATIENT
Start: 2021-10-15 | End: 2021-10-15

## 2021-10-15 RX ORDER — METFORMIN HYDROCHLORIDE 850 MG/1
500 TABLET ORAL EVERY 12 HOURS
Refills: 0 | Status: DISCONTINUED | OUTPATIENT
Start: 2021-10-15 | End: 2021-10-16

## 2021-10-15 RX ORDER — LOSARTAN POTASSIUM 100 MG/1
100 TABLET, FILM COATED ORAL DAILY
Refills: 0 | Status: DISCONTINUED | OUTPATIENT
Start: 2021-10-15 | End: 2021-10-16

## 2021-10-15 RX ORDER — DEXTROSE 50 % IN WATER 50 %
15 SYRINGE (ML) INTRAVENOUS ONCE
Refills: 0 | Status: DISCONTINUED | OUTPATIENT
Start: 2021-10-15 | End: 2021-10-16

## 2021-10-15 RX ORDER — ACETAMINOPHEN 500 MG
650 TABLET ORAL ONCE
Refills: 0 | Status: COMPLETED | OUTPATIENT
Start: 2021-10-15 | End: 2021-10-15

## 2021-10-15 RX ORDER — CEFTRIAXONE 500 MG/1
1000 INJECTION, POWDER, FOR SOLUTION INTRAMUSCULAR; INTRAVENOUS EVERY 12 HOURS
Refills: 0 | Status: DISCONTINUED | OUTPATIENT
Start: 2021-10-15 | End: 2021-10-16

## 2021-10-15 RX ADMIN — Medication 25 MILLIGRAM(S): at 18:54

## 2021-10-15 RX ADMIN — Medication 15 MILLIGRAM(S): at 08:21

## 2021-10-15 RX ADMIN — SODIUM CHLORIDE 2700 MILLILITER(S): 9 INJECTION INTRAMUSCULAR; INTRAVENOUS; SUBCUTANEOUS at 12:55

## 2021-10-15 RX ADMIN — Medication 975 MILLIGRAM(S): at 20:52

## 2021-10-15 RX ADMIN — CEFTRIAXONE 100 MILLIGRAM(S): 500 INJECTION, POWDER, FOR SOLUTION INTRAMUSCULAR; INTRAVENOUS at 10:03

## 2021-10-15 RX ADMIN — CEFTRIAXONE 100 MILLIGRAM(S): 500 INJECTION, POWDER, FOR SOLUTION INTRAMUSCULAR; INTRAVENOUS at 23:30

## 2021-10-15 RX ADMIN — Medication 650 MILLIGRAM(S): at 08:21

## 2021-10-15 RX ADMIN — LOSARTAN POTASSIUM 100 MILLIGRAM(S): 100 TABLET, FILM COATED ORAL at 18:50

## 2021-10-15 RX ADMIN — METFORMIN HYDROCHLORIDE 500 MILLIGRAM(S): 850 TABLET ORAL at 20:35

## 2021-10-15 RX ADMIN — MORPHINE SULFATE 4 MILLIGRAM(S): 50 CAPSULE, EXTENDED RELEASE ORAL at 16:28

## 2021-10-15 RX ADMIN — Medication 5 MILLIGRAM(S): at 12:34

## 2021-10-15 RX ADMIN — Medication 100 MILLIGRAM(S): at 20:35

## 2021-10-15 RX ADMIN — SODIUM CHLORIDE 2700 MILLILITER(S): 9 INJECTION INTRAMUSCULAR; INTRAVENOUS; SUBCUTANEOUS at 08:21

## 2021-10-15 RX ADMIN — Medication 15 MILLIGRAM(S): at 09:00

## 2021-10-15 RX ADMIN — CEFTRIAXONE 1000 MILLIGRAM(S): 500 INJECTION, POWDER, FOR SOLUTION INTRAMUSCULAR; INTRAVENOUS at 10:30

## 2021-10-15 RX ADMIN — Medication 81 MILLIGRAM(S): at 20:35

## 2021-10-15 RX ADMIN — ATORVASTATIN CALCIUM 20 MILLIGRAM(S): 80 TABLET, FILM COATED ORAL at 20:35

## 2021-10-15 RX ADMIN — Medication 650 MILLIGRAM(S): at 09:00

## 2021-10-15 RX ADMIN — SODIUM CHLORIDE 110 MILLILITER(S): 9 INJECTION INTRAMUSCULAR; INTRAVENOUS; SUBCUTANEOUS at 21:09

## 2021-10-15 RX ADMIN — Medication 975 MILLIGRAM(S): at 19:55

## 2021-10-15 RX ADMIN — SODIUM CHLORIDE 1000 MILLILITER(S): 9 INJECTION INTRAMUSCULAR; INTRAVENOUS; SUBCUTANEOUS at 21:09

## 2021-10-15 RX ADMIN — LIDOCAINE 1 PATCH: 4 CREAM TOPICAL at 08:22

## 2021-10-15 NOTE — ED CDU PROVIDER INITIAL DAY NOTE - PROGRESS NOTE DETAILS
**CDU ATTENDING ADDENDUM (Dr. Merlin Kwok): patient serially evaluated throughout CDU course by SKINNY Blake. NO acute deterioration up to this time in the CDU, but noted with fevers. Asked by SKINNY Blake to review case. CDU diagnostics and consultation(s) (if applicable) up to this time have been reviewed, acknowledged and noted. Awaiting MRI. CT and UA noted. Agree with continuation of IV antibiotics as previously ordered and with repeat diagnostics (lactate, VBG). CDU team Will continue to observe and monitor closely. Pt was febrile to 101.4F and tachycardic to 120-130s sinus rhythm.   States that his lower back pain is better with improved positioning the bed although still complaints of lower back pain. Tylenol given, although declines other pain medication. Given IVF. States that he has been having dysuria and increased urinary frequency. He also states that he has been having sensation changes to his lower extremities/feet bilaterally, nonspecific.   Neuro check: Patient alert and oriented. Strength equal b/l UE/LE 5/5. No pronator drift. Negative straight leg raise. Sensation grossly equal b/l UE/LE. NO CVA tenderness. No midline spinal tenderness. No lower back tenderness upon palpation. Per patient, pain is more internal. Concern for Pyelo  v. stone  v. other viral pathology v. L spine pathology, although unclear at this time. Will repeat labs. Pending MRI, will continue to closely monitor.   Discussed with Dr. Kwok. - Sana Blake PA-C Pt was febrile to 101.4F and tachycardic to 120-130s sinus rhythm.   States that his lower back pain is better with improved positioning the bed although still complaints of lower back pain. Tylenol given, although declines other pain medication. Given IVF. Prior to coming to ED, states that he has been having dysuria and increased urinary frequency. He also states that he has also been having sensation changes to his lower extremities/feet bilaterally, nonspecific.   Neuro check: Patient alert and oriented. Strength equal b/l UE/LE 5/5. No pronator drift. Negative straight leg raise. Sensation grossly equal b/l UE/LE. NO CVA tenderness. No midline spinal tenderness. No lower back tenderness upon palpation. Per patient, pain is more internal. Concern for Pyelo  v. stone  v. other viral pathology v. L spine pathology, although unclear at this time. Will repeat labs. Pending MRI, will continue to closely monitor.   Discussed with Dr. Kwok. - Sana Blake PA-C

## 2021-10-15 NOTE — ED PROVIDER NOTE - CLINICAL SUMMARY MEDICAL DECISION MAKING FREE TEXT BOX
Elke-PGY3: 51 year old female with PMH HTN, HLD, DM presents with back pain x 5 days. Pt reports gradual onset, nonradiating, bilateral low back pain, worse with movement. Pt admits to chills, but denies any fevers, chest pain, shortness of breath, abdominal pain, vomiting, diarrhea, dysuria, headache, vision change, numbness, weakness, or rash. Pt admits to occasional dysuria, but denies hematuria, bowel/bladder dysfunction, or saddle anesthesias. Denies any recent injury or trauma. Pt reports taking OTC ibuprofen with little improvement, last dose yesterday. Pt reports difficulty sleeping last night due to pain. Unclear etiology, possible MSK as pt with pain worse with movement. Pt history of kidney stones with occasional dysuria. Pt with right > left CVA tenderness on exam, no midline bony tenderness. Will obtain labs, imaging, supportive treatment and reassessment with dispo accordingly. Elke-PGY3: 51 year old female with PMH HTN, HLD, DM presents with back pain x 5 days. Pt reports gradual onset, nonradiating, bilateral low back pain, worse with movement. Pt admits to chills, but denies any fevers, chest pain, shortness of breath, abdominal pain, vomiting, diarrhea, dysuria, headache, vision change, numbness, weakness, or rash. Pt admits to occasional dysuria, but denies hematuria, bowel/bladder dysfunction, or saddle anesthesias. Denies any recent injury or trauma. Pt reports taking OTC ibuprofen with little improvement, last dose yesterday. Pt reports difficulty sleeping last night due to pain. Unclear etiology, possible MSK as pt with pain worse with movement. Pt history of kidney stones with occasional dysuria. Pt with right > left CVA tenderness on exam, no midline bony tenderness. Will obtain labs, imaging, supportive treatment and reassessment with dispo accordingly.    Attending MD Estrada: 50 yo male with PMH for HTN, HLD, DM presents with low back to gradual onset 5 days ago.  Worse with movement.  Denies abd pain, fever, nausea, vomiting, diarrhea.  No trauma, no bowel or bladder incontinence. Neuro exam normal.  Labs, UA and CT stone hunt ordered.  Urine with moderate blood.  CT showed possible recently passed stone but upon reassessment patient continues to have pain with walking and on further interview states he feels some numbness in bilateral toes.  Patient to be placed in CDU for MRI and continued evaluation.

## 2021-10-15 NOTE — ED PROVIDER NOTE - NS ED ROS FT
Review of Systems    Constitutional: (-) fever, (+) chills, (-) fatigue  Cardiovascular: (-) chest pain, (-) syncope  Respiratory: (-) cough, (-) shortness of breath  Gastrointestinal: (-) vomiting, (-) diarrhea, (-) abdominal pain  Musculoskeletal: (-) neck pain, (+) back pain, (-) joint pain  Integumentary: (-) rash, (-) edema, (-) wound  Neurological: (-) headache, (-) altered mental status    Except as documented in the HPI, all other systems are negative.

## 2021-10-15 NOTE — ED CDU PROVIDER INITIAL DAY NOTE - OBJECTIVE STATEMENT
51 year old female with PMH HTN, HLD, DM presents with back pain x 5 days. Pt reports gradual onset, nonradiating, bilateral low back pain, worse with movement. Pt admits to chills, but denies any fevers, chest pain, shortness of breath, abdominal pain, vomiting, diarrhea, dysuria, headache, vision change, numbness, weakness, or rash. Pt admits to occasional dysuria, but denies hematuria, bowel/bladder dysfunction, or saddle anesthesias. Denies any recent injury or trauma. Pt reports taking OTC ibuprofen with little improvement, last dose yesterday. Pt reports difficulty sleeping last night due to pain. Denies any additional complaints. 51 year old male with PMH HTN, HLD, DM presents with low back pain x 5 days. Pt reports gradual onset, nonradiating, bilateral low back pain, worse with movement. Pt admits to chills, but denies any fevers, chest pain, shortness of breath, abdominal pain, vomiting, diarrhea, dysuria, headache, vision change, numbness, weakness, or rash. Pt admits to occasional dysuria, but denies hematuria, bowel/bladder dysfunction, or saddle anesthesias. Denies any recent injury or trauma. Pt reports taking OTC ibuprofen with little improvement, last dose yesterday. Pt reports difficulty sleeping last night due to pain. Denies any additional complaints. no recent travel.

## 2021-10-15 NOTE — ED CDU PROVIDER INITIAL DAY NOTE - ATTENDING CONTRIBUTION TO CARE
Attending MD Estrada:   I personally have seen and examined this patient.  Physician assistant note reviewed and agree on plan of care and except where noted.

## 2021-10-15 NOTE — ED ADULT NURSE NOTE - OBJECTIVE STATEMENT
50 yo presents to the ED from home. A&OX4, ambulatory c/o R lower back pain. pt reports pain x 3 days. pt reports burning on urination. pt denies fever, chills. febrile in triage 100.5. pt denies sick contact, travel, cough, SOB. pt is fully vaccinated for COVID. pt reports back hurts worse when moving. last took Motrin yesterday with no relief. pt denies trauma. pt denies radiation of pain. pt denies abd pain. abd soft nondistended nontender. pt well appearing. 18G inserted in LAC. Patient undressed and placed into gown, call bell in hand and side rails up for safety. warm blanket provided, vital signs stable, pt in no acute distress.

## 2021-10-15 NOTE — ED ADULT NURSE REASSESSMENT NOTE - NSIMPLEMENTINTERV_GEN_ALL_ED
Implemented All Fall Risk Interventions:  [place] to call system. Call bell, personal items and telephone within reach. Instruct patient to call for assistance. Room bathroom lighting operational. Non-slip footwear when patient is off stretcher. Physically safe environment: no spills, clutter or unnecessary equipment. Stretcher in lowest position, wheels locked, appropriate side rails in place. Provide visual cue, wrist band, yellow gown, etc. Monitor gait and stability. Monitor for mental status changes and reorient to person, place, and time. Review medications for side effects contributing to fall risk. Reinforce activity limits and safety measures with patient and family.

## 2021-10-15 NOTE — ED PROVIDER NOTE - PROGRESS NOTE DETAILS
Elke-PGY3: spoke to CDU PA, pending observation pt seen and evaluated. reports improved pain but still in some pain. Temp of 99.2 PO. Prelim +blood cx and MRI L spine with possible early abscess in the R T10 paraspinal region. Spoke to Ortho/Spine consult. Ordered for Vanco/Zosyn. Plan of care discussed with patient. Dr. Young aware. -ANDREW Hoffmann

## 2021-10-15 NOTE — ED ADULT NURSE REASSESSMENT NOTE - VOIDING
burning with urination/difficulty starting stream burning with urination/difficulty starting stream/frequency

## 2021-10-15 NOTE — ED PROVIDER NOTE - ATTENDING CONTRIBUTION TO CARE
Attending MD Estrada:  I personally have seen and examined this patient.  Resident note reviewed and agree on plan of care and except where noted.

## 2021-10-15 NOTE — ED CDU PROVIDER INITIAL DAY NOTE - MEDICAL DECISION MAKING DETAILS
Attending MD Estrada: 50 yo male with PMH for HTN, HLD, DM presents with low back to gradual onset 5 days ago.  Worse with movement.  Denies abd pain, fever, nausea, vomiting, diarrhea.  No trauma, no bowel or bladder incontinence. Neuro exam normal.  Labs, UA and CT stone hunt ordered.  Urine with moderate blood.  CT showed possible recently passed stone but upon reassessment patient continues to have pain with walking and on further interview states he feels some numbness in bilateral toes.  Patient to be placed in CDU for MRI and continued evaluation.

## 2021-10-15 NOTE — ED PROVIDER NOTE - NSICDXPASTMEDICALHX_GEN_ALL_CORE_FT
PAST MEDICAL HISTORY:  Diabetes     HLD (hyperlipidemia)     HTN (hypertension)     HTN (hypertension)

## 2021-10-15 NOTE — ED PROVIDER NOTE - PHYSICAL EXAMINATION
CONSTITUTIONAL: non-toxic, well appearing  SKIN: no rash, no petechiae.  EYES: pink conjunctiva, anicteric  NECK: Supple; no meningismus, no JVD  CARD: RRR, no murmurs, equal radial pulses bilaterally 2+  RESP: CTAB, no respiratory distress  ABD: Soft, non-tender, non-distended, no peritoneal signs, right > left CVA tenderness  EXT: Normal ROM x4. No edema.   SPINE: no midline bony tenderness.   NEURO: Alert, oriented. Neuro exam nonfocal, equal strength bilaterally. Negative straight leg raise bilaterally. Steady gait.   PSYCH: Cooperative, appropriate.

## 2021-10-15 NOTE — ED PROVIDER NOTE - OBJECTIVE STATEMENT
51 year old female with PMH HTN, HLD, DM presents with back pain x 5 days. Pt reports gradual onset, nonradiating, bilateral low back pain, worse with movement. Pt admits to chills, but denies any fevers, chest pain, shortness of breath, abdominal pain, vomiting, diarrhea, dysuria, headache, vision change, numbness, weakness, or rash. Pt admits to occasional dysuria, but denies hematuria, bowel/bladder dysfunction, or saddle anesthesias. Denies any recent injury or trauma. Pt reports taking OTC ibuprofen with little improvement, last dose yesterday. Pt reports difficulty sleeping last night due to pain. Denies any additional complaints.

## 2021-10-16 ENCOUNTER — RESULT REVIEW (OUTPATIENT)
Age: 51
End: 2021-10-16

## 2021-10-16 ENCOUNTER — TRANSCRIPTION ENCOUNTER (OUTPATIENT)
Age: 51
End: 2021-10-16

## 2021-10-16 DIAGNOSIS — R50.9 FEVER, UNSPECIFIED: ICD-10-CM

## 2021-10-16 DIAGNOSIS — R78.81 BACTEREMIA: ICD-10-CM

## 2021-10-16 DIAGNOSIS — G06.2 EXTRADURAL AND SUBDURAL ABSCESS, UNSPECIFIED: ICD-10-CM

## 2021-10-16 DIAGNOSIS — D72.829 ELEVATED WHITE BLOOD CELL COUNT, UNSPECIFIED: ICD-10-CM

## 2021-10-16 DIAGNOSIS — A40.9 STREPTOCOCCAL SEPSIS, UNSPECIFIED: ICD-10-CM

## 2021-10-16 DIAGNOSIS — M54.9 DORSALGIA, UNSPECIFIED: ICD-10-CM

## 2021-10-16 LAB
A1C WITH ESTIMATED AVERAGE GLUCOSE RESULT: 7.2 % — HIGH (ref 4–5.6)
ALBUMIN SERPL ELPH-MCNC: 3.8 G/DL — SIGNIFICANT CHANGE UP (ref 3.3–5)
ALP SERPL-CCNC: 131 U/L — HIGH (ref 40–120)
ALT FLD-CCNC: 63 U/L — HIGH (ref 10–45)
ANION GAP SERPL CALC-SCNC: 12 MMOL/L — SIGNIFICANT CHANGE UP (ref 5–17)
ANION GAP SERPL CALC-SCNC: 13 MMOL/L — SIGNIFICANT CHANGE UP (ref 5–17)
APTT BLD: 29.9 SEC — SIGNIFICANT CHANGE UP (ref 27.5–35.5)
AST SERPL-CCNC: 30 U/L — SIGNIFICANT CHANGE UP (ref 10–40)
BASOPHILS # BLD AUTO: 0.02 K/UL — SIGNIFICANT CHANGE UP (ref 0–0.2)
BASOPHILS # BLD AUTO: 0.03 K/UL — SIGNIFICANT CHANGE UP (ref 0–0.2)
BASOPHILS NFR BLD AUTO: 0.2 % — SIGNIFICANT CHANGE UP (ref 0–2)
BASOPHILS NFR BLD AUTO: 0.3 % — SIGNIFICANT CHANGE UP (ref 0–2)
BILIRUB SERPL-MCNC: 0.7 MG/DL — SIGNIFICANT CHANGE UP (ref 0.2–1.2)
BLD GP AB SCN SERPL QL: NEGATIVE — SIGNIFICANT CHANGE UP
BUN SERPL-MCNC: 14 MG/DL — SIGNIFICANT CHANGE UP (ref 7–23)
BUN SERPL-MCNC: 15 MG/DL — SIGNIFICANT CHANGE UP (ref 7–23)
CALCIUM SERPL-MCNC: 8.5 MG/DL — SIGNIFICANT CHANGE UP (ref 8.4–10.5)
CALCIUM SERPL-MCNC: 9.3 MG/DL — SIGNIFICANT CHANGE UP (ref 8.4–10.5)
CHLORIDE SERPL-SCNC: 97 MMOL/L — SIGNIFICANT CHANGE UP (ref 96–108)
CHLORIDE SERPL-SCNC: 97 MMOL/L — SIGNIFICANT CHANGE UP (ref 96–108)
CO2 SERPL-SCNC: 24 MMOL/L — SIGNIFICANT CHANGE UP (ref 22–31)
CO2 SERPL-SCNC: 25 MMOL/L — SIGNIFICANT CHANGE UP (ref 22–31)
CREAT SERPL-MCNC: 0.85 MG/DL — SIGNIFICANT CHANGE UP (ref 0.5–1.3)
CREAT SERPL-MCNC: 0.95 MG/DL — SIGNIFICANT CHANGE UP (ref 0.5–1.3)
CULTURE RESULTS: SIGNIFICANT CHANGE UP
EOSINOPHIL # BLD AUTO: 0.06 K/UL — SIGNIFICANT CHANGE UP (ref 0–0.5)
EOSINOPHIL # BLD AUTO: 0.07 K/UL — SIGNIFICANT CHANGE UP (ref 0–0.5)
EOSINOPHIL NFR BLD AUTO: 0.5 % — SIGNIFICANT CHANGE UP (ref 0–6)
EOSINOPHIL NFR BLD AUTO: 0.6 % — SIGNIFICANT CHANGE UP (ref 0–6)
ESTIMATED AVERAGE GLUCOSE: 160 MG/DL — HIGH (ref 68–114)
GAS PNL BLDA: SIGNIFICANT CHANGE UP
GAS PNL BLDV: SIGNIFICANT CHANGE UP
GLUCOSE BLDC GLUCOMTR-MCNC: 154 MG/DL — HIGH (ref 70–99)
GLUCOSE BLDC GLUCOMTR-MCNC: 177 MG/DL — HIGH (ref 70–99)
GLUCOSE SERPL-MCNC: 187 MG/DL — HIGH (ref 70–99)
GLUCOSE SERPL-MCNC: 204 MG/DL — HIGH (ref 70–99)
GP B STREP DNA BLD POS QL NAA+NON-PROBE: SIGNIFICANT CHANGE UP
GRAM STN FLD: SIGNIFICANT CHANGE UP
HCT VFR BLD CALC: 32.4 % — LOW (ref 39–50)
HCT VFR BLD CALC: 35.8 % — LOW (ref 39–50)
HGB BLD-MCNC: 10.6 G/DL — LOW (ref 13–17)
HGB BLD-MCNC: 11.4 G/DL — LOW (ref 13–17)
IMM GRANULOCYTES NFR BLD AUTO: 1.3 % — SIGNIFICANT CHANGE UP (ref 0–1.5)
IMM GRANULOCYTES NFR BLD AUTO: 1.5 % — SIGNIFICANT CHANGE UP (ref 0–1.5)
INR BLD: 1.18 RATIO — HIGH (ref 0.88–1.16)
LYMPHOCYTES # BLD AUTO: 19.5 % — SIGNIFICANT CHANGE UP (ref 13–44)
LYMPHOCYTES # BLD AUTO: 19.6 % — SIGNIFICANT CHANGE UP (ref 13–44)
LYMPHOCYTES # BLD AUTO: 2.24 K/UL — SIGNIFICANT CHANGE UP (ref 1–3.3)
LYMPHOCYTES # BLD AUTO: 2.32 K/UL — SIGNIFICANT CHANGE UP (ref 1–3.3)
MCHC RBC-ENTMCNC: 25.5 PG — LOW (ref 27–34)
MCHC RBC-ENTMCNC: 25.8 PG — LOW (ref 27–34)
MCHC RBC-ENTMCNC: 31.8 GM/DL — LOW (ref 32–36)
MCHC RBC-ENTMCNC: 32.7 GM/DL — SIGNIFICANT CHANGE UP (ref 32–36)
MCV RBC AUTO: 78.8 FL — LOW (ref 80–100)
MCV RBC AUTO: 80.1 FL — SIGNIFICANT CHANGE UP (ref 80–100)
METHOD TYPE: SIGNIFICANT CHANGE UP
MONOCYTES # BLD AUTO: 0.62 K/UL — SIGNIFICANT CHANGE UP (ref 0–0.9)
MONOCYTES # BLD AUTO: 1.21 K/UL — HIGH (ref 0–0.9)
MONOCYTES NFR BLD AUTO: 10.5 % — SIGNIFICANT CHANGE UP (ref 2–14)
MONOCYTES NFR BLD AUTO: 5.2 % — SIGNIFICANT CHANGE UP (ref 2–14)
NEUTROPHILS # BLD AUTO: 7.8 K/UL — HIGH (ref 1.8–7.4)
NEUTROPHILS # BLD AUTO: 8.65 K/UL — HIGH (ref 1.8–7.4)
NEUTROPHILS NFR BLD AUTO: 67.9 % — SIGNIFICANT CHANGE UP (ref 43–77)
NEUTROPHILS NFR BLD AUTO: 72.9 % — SIGNIFICANT CHANGE UP (ref 43–77)
NRBC # BLD: 0 /100 WBCS — SIGNIFICANT CHANGE UP (ref 0–0)
NRBC # BLD: 0 /100 WBCS — SIGNIFICANT CHANGE UP (ref 0–0)
PLATELET # BLD AUTO: 336 K/UL — SIGNIFICANT CHANGE UP (ref 150–400)
PLATELET # BLD AUTO: 337 K/UL — SIGNIFICANT CHANGE UP (ref 150–400)
POTASSIUM SERPL-MCNC: 4.3 MMOL/L — SIGNIFICANT CHANGE UP (ref 3.5–5.3)
POTASSIUM SERPL-MCNC: 4.4 MMOL/L — SIGNIFICANT CHANGE UP (ref 3.5–5.3)
POTASSIUM SERPL-SCNC: 4.3 MMOL/L — SIGNIFICANT CHANGE UP (ref 3.5–5.3)
POTASSIUM SERPL-SCNC: 4.4 MMOL/L — SIGNIFICANT CHANGE UP (ref 3.5–5.3)
PROT SERPL-MCNC: 7.6 G/DL — SIGNIFICANT CHANGE UP (ref 6–8.3)
PROTHROM AB SERPL-ACNC: 14.1 SEC — HIGH (ref 10.6–13.6)
RBC # BLD: 4.11 M/UL — LOW (ref 4.2–5.8)
RBC # BLD: 4.47 M/UL — SIGNIFICANT CHANGE UP (ref 4.2–5.8)
RBC # FLD: 14.7 % — HIGH (ref 10.3–14.5)
RBC # FLD: 14.7 % — HIGH (ref 10.3–14.5)
RH IG SCN BLD-IMP: POSITIVE — SIGNIFICANT CHANGE UP
SODIUM SERPL-SCNC: 133 MMOL/L — LOW (ref 135–145)
SODIUM SERPL-SCNC: 135 MMOL/L — SIGNIFICANT CHANGE UP (ref 135–145)
SPECIMEN SOURCE: SIGNIFICANT CHANGE UP
SPECIMEN SOURCE: SIGNIFICANT CHANGE UP
WBC # BLD: 11.49 K/UL — HIGH (ref 3.8–10.5)
WBC # BLD: 11.86 K/UL — HIGH (ref 3.8–10.5)
WBC # FLD AUTO: 11.49 K/UL — HIGH (ref 3.8–10.5)
WBC # FLD AUTO: 11.86 K/UL — HIGH (ref 3.8–10.5)

## 2021-10-16 PROCEDURE — 63048 LAM FACETEC &FORAMOT EA ADDL: CPT

## 2021-10-16 PROCEDURE — 88305 TISSUE EXAM BY PATHOLOGIST: CPT | Mod: 26

## 2021-10-16 PROCEDURE — 99255 IP/OBS CONSLTJ NEW/EST HI 80: CPT | Mod: GC

## 2021-10-16 PROCEDURE — 63046 LAM FACETEC & FORAMOT THRC: CPT | Mod: 59

## 2021-10-16 PROCEDURE — 72157 MRI CHEST SPINE W/O & W/DYE: CPT | Mod: 26

## 2021-10-16 PROCEDURE — 72156 MRI NECK SPINE W/O & W/DYE: CPT | Mod: 26

## 2021-10-16 PROCEDURE — 99217: CPT

## 2021-10-16 PROCEDURE — 63266 EXCISE INTRSPINL LESION THRC: CPT

## 2021-10-16 RX ORDER — SODIUM CHLORIDE 9 MG/ML
1000 INJECTION, SOLUTION INTRAVENOUS
Refills: 0 | Status: DISCONTINUED | OUTPATIENT
Start: 2021-10-16 | End: 2021-10-21

## 2021-10-16 RX ORDER — ATORVASTATIN CALCIUM 80 MG/1
1 TABLET, FILM COATED ORAL
Qty: 0 | Refills: 0 | DISCHARGE
Start: 2021-10-16

## 2021-10-16 RX ORDER — METOPROLOL TARTRATE 50 MG
1 TABLET ORAL
Qty: 0 | Refills: 0 | DISCHARGE
Start: 2021-10-16

## 2021-10-16 RX ORDER — CEFTRIAXONE 500 MG/1
1000 INJECTION, POWDER, FOR SOLUTION INTRAMUSCULAR; INTRAVENOUS EVERY 24 HOURS
Refills: 0 | Status: DISCONTINUED | OUTPATIENT
Start: 2021-10-17 | End: 2021-10-18

## 2021-10-16 RX ORDER — INSULIN LISPRO 100/ML
0 VIAL (ML) SUBCUTANEOUS
Qty: 0 | Refills: 0 | DISCHARGE
Start: 2021-10-16

## 2021-10-16 RX ORDER — LOSARTAN POTASSIUM 100 MG/1
1 TABLET, FILM COATED ORAL
Qty: 0 | Refills: 0 | DISCHARGE
Start: 2021-10-16

## 2021-10-16 RX ORDER — ATORVASTATIN CALCIUM 80 MG/1
1 TABLET, FILM COATED ORAL
Qty: 0 | Refills: 0 | DISCHARGE

## 2021-10-16 RX ORDER — SENNA PLUS 8.6 MG/1
2 TABLET ORAL AT BEDTIME
Refills: 0 | Status: DISCONTINUED | OUTPATIENT
Start: 2021-10-16 | End: 2021-10-21

## 2021-10-16 RX ORDER — LOSARTAN/HYDROCHLOROTHIAZIDE 100MG-25MG
1 TABLET ORAL
Qty: 0 | Refills: 0 | DISCHARGE

## 2021-10-16 RX ORDER — ACETAMINOPHEN 500 MG
975 TABLET ORAL EVERY 8 HOURS
Refills: 0 | Status: DISCONTINUED | OUTPATIENT
Start: 2021-10-16 | End: 2021-10-21

## 2021-10-16 RX ORDER — MULTIVIT-MIN/FERROUS GLUCONATE 9 MG/15 ML
1 LIQUID (ML) ORAL
Qty: 0 | Refills: 0 | DISCHARGE

## 2021-10-16 RX ORDER — METOPROLOL TARTRATE 50 MG
100 TABLET ORAL DAILY
Refills: 0 | Status: DISCONTINUED | OUTPATIENT
Start: 2021-10-16 | End: 2021-10-21

## 2021-10-16 RX ORDER — ATORVASTATIN CALCIUM 80 MG/1
20 TABLET, FILM COATED ORAL AT BEDTIME
Refills: 0 | Status: DISCONTINUED | OUTPATIENT
Start: 2021-10-16 | End: 2021-10-21

## 2021-10-16 RX ORDER — HYDROMORPHONE HYDROCHLORIDE 2 MG/ML
0.5 INJECTION INTRAMUSCULAR; INTRAVENOUS; SUBCUTANEOUS
Refills: 0 | Status: DISCONTINUED | OUTPATIENT
Start: 2021-10-16 | End: 2021-10-17

## 2021-10-16 RX ORDER — CEFTRIAXONE 500 MG/1
2 INJECTION, POWDER, FOR SOLUTION INTRAMUSCULAR; INTRAVENOUS
Qty: 0 | Refills: 0 | DISCHARGE
Start: 2021-10-16

## 2021-10-16 RX ORDER — METFORMIN HYDROCHLORIDE 850 MG/1
1 TABLET ORAL
Qty: 0 | Refills: 0 | DISCHARGE

## 2021-10-16 RX ORDER — VANCOMYCIN HCL 1 G
1250 VIAL (EA) INTRAVENOUS ONCE
Refills: 0 | Status: COMPLETED | OUTPATIENT
Start: 2021-10-16 | End: 2021-10-16

## 2021-10-16 RX ORDER — HYDROCHLOROTHIAZIDE 25 MG
25 TABLET ORAL DAILY
Refills: 0 | Status: DISCONTINUED | OUTPATIENT
Start: 2021-10-16 | End: 2021-10-21

## 2021-10-16 RX ORDER — ONDANSETRON 8 MG/1
4 TABLET, FILM COATED ORAL ONCE
Refills: 0 | Status: DISCONTINUED | OUTPATIENT
Start: 2021-10-16 | End: 2021-10-17

## 2021-10-16 RX ORDER — GLUCAGON INJECTION, SOLUTION 0.5 MG/.1ML
1 INJECTION, SOLUTION SUBCUTANEOUS ONCE
Refills: 0 | Status: DISCONTINUED | OUTPATIENT
Start: 2021-10-16 | End: 2021-10-21

## 2021-10-16 RX ORDER — DEXTROSE 50 % IN WATER 50 %
12.5 SYRINGE (ML) INTRAVENOUS ONCE
Refills: 0 | Status: DISCONTINUED | OUTPATIENT
Start: 2021-10-16 | End: 2021-10-21

## 2021-10-16 RX ORDER — ASPIRIN/CALCIUM CARB/MAGNESIUM 324 MG
1 TABLET ORAL
Qty: 0 | Refills: 0 | DISCHARGE

## 2021-10-16 RX ORDER — CYCLOBENZAPRINE HYDROCHLORIDE 10 MG/1
10 TABLET, FILM COATED ORAL EVERY 8 HOURS
Refills: 0 | Status: DISCONTINUED | OUTPATIENT
Start: 2021-10-16 | End: 2021-10-21

## 2021-10-16 RX ORDER — INSULIN LISPRO 100/ML
VIAL (ML) SUBCUTANEOUS
Refills: 0 | Status: DISCONTINUED | OUTPATIENT
Start: 2021-10-16 | End: 2021-10-21

## 2021-10-16 RX ORDER — ONDANSETRON 8 MG/1
4 TABLET, FILM COATED ORAL EVERY 6 HOURS
Refills: 0 | Status: DISCONTINUED | OUTPATIENT
Start: 2021-10-16 | End: 2021-10-21

## 2021-10-16 RX ORDER — DEXTROSE 50 % IN WATER 50 %
25 SYRINGE (ML) INTRAVENOUS ONCE
Refills: 0 | Status: DISCONTINUED | OUTPATIENT
Start: 2021-10-16 | End: 2021-10-21

## 2021-10-16 RX ORDER — VANCOMYCIN HCL 1 G
VIAL (EA) INTRAVENOUS
Refills: 0 | Status: DISCONTINUED | OUTPATIENT
Start: 2021-10-16 | End: 2021-10-16

## 2021-10-16 RX ORDER — INSULIN LISPRO 100/ML
VIAL (ML) SUBCUTANEOUS AT BEDTIME
Refills: 0 | Status: DISCONTINUED | OUTPATIENT
Start: 2021-10-16 | End: 2021-10-21

## 2021-10-16 RX ORDER — VANCOMYCIN HCL 1 G
1250 VIAL (EA) INTRAVENOUS EVERY 12 HOURS
Refills: 0 | Status: DISCONTINUED | OUTPATIENT
Start: 2021-10-16 | End: 2021-10-16

## 2021-10-16 RX ORDER — LOSARTAN POTASSIUM 100 MG/1
100 TABLET, FILM COATED ORAL DAILY
Refills: 0 | Status: DISCONTINUED | OUTPATIENT
Start: 2021-10-16 | End: 2021-10-21

## 2021-10-16 RX ORDER — HYDROMORPHONE HYDROCHLORIDE 2 MG/ML
1 INJECTION INTRAMUSCULAR; INTRAVENOUS; SUBCUTANEOUS
Refills: 0 | Status: DISCONTINUED | OUTPATIENT
Start: 2021-10-16 | End: 2021-10-17

## 2021-10-16 RX ORDER — METOPROLOL TARTRATE 50 MG
1 TABLET ORAL
Qty: 0 | Refills: 0 | DISCHARGE

## 2021-10-16 RX ORDER — CEFTRIAXONE 500 MG/1
2000 INJECTION, POWDER, FOR SOLUTION INTRAMUSCULAR; INTRAVENOUS EVERY 24 HOURS
Refills: 0 | Status: CANCELLED | OUTPATIENT
Start: 2021-10-17 | End: 2021-10-16

## 2021-10-16 RX ORDER — PIPERACILLIN AND TAZOBACTAM 4; .5 G/20ML; G/20ML
3.38 INJECTION, POWDER, LYOPHILIZED, FOR SOLUTION INTRAVENOUS ONCE
Refills: 0 | Status: DISCONTINUED | OUTPATIENT
Start: 2021-10-16 | End: 2021-10-16

## 2021-10-16 RX ORDER — DEXTROSE 50 % IN WATER 50 %
15 SYRINGE (ML) INTRAVENOUS ONCE
Refills: 0 | Status: DISCONTINUED | OUTPATIENT
Start: 2021-10-16 | End: 2021-10-21

## 2021-10-16 RX ORDER — CEFTRIAXONE 500 MG/1
2000 INJECTION, POWDER, FOR SOLUTION INTRAMUSCULAR; INTRAVENOUS ONCE
Refills: 0 | Status: COMPLETED | OUTPATIENT
Start: 2021-10-16 | End: 2021-10-16

## 2021-10-16 RX ORDER — CEFTRIAXONE 500 MG/1
INJECTION, POWDER, FOR SOLUTION INTRAMUSCULAR; INTRAVENOUS
Refills: 0 | Status: DISCONTINUED | OUTPATIENT
Start: 2021-10-16 | End: 2021-10-16

## 2021-10-16 RX ORDER — PIPERACILLIN AND TAZOBACTAM 4; .5 G/20ML; G/20ML
3.38 INJECTION, POWDER, LYOPHILIZED, FOR SOLUTION INTRAVENOUS EVERY 8 HOURS
Refills: 0 | Status: DISCONTINUED | OUTPATIENT
Start: 2021-10-16 | End: 2021-10-16

## 2021-10-16 RX ADMIN — METFORMIN HYDROCHLORIDE 500 MILLIGRAM(S): 850 TABLET ORAL at 09:05

## 2021-10-16 RX ADMIN — Medication 1: at 09:04

## 2021-10-16 RX ADMIN — SODIUM CHLORIDE 110 MILLILITER(S): 9 INJECTION INTRAMUSCULAR; INTRAVENOUS; SUBCUTANEOUS at 12:55

## 2021-10-16 RX ADMIN — Medication 166.67 MILLIGRAM(S): at 09:05

## 2021-10-16 RX ADMIN — CEFTRIAXONE 1000 MILLIGRAM(S): 500 INJECTION, POWDER, FOR SOLUTION INTRAMUSCULAR; INTRAVENOUS at 00:00

## 2021-10-16 RX ADMIN — CEFTRIAXONE 100 MILLIGRAM(S): 500 INJECTION, POWDER, FOR SOLUTION INTRAMUSCULAR; INTRAVENOUS at 12:53

## 2021-10-16 RX ADMIN — SODIUM CHLORIDE 50 MILLILITER(S): 9 INJECTION, SOLUTION INTRAVENOUS at 22:44

## 2021-10-16 NOTE — H&P ADULT - NSHPLABSRESULTS_GEN_ALL_CORE
11.4   11.49 )-----------( 336      ( 16 Oct 2021 06:17 )             35.8       10-16    135  |  97  |  14  ----------------------------<  187<H>  4.4   |  25  |  0.95    Ca    9.3      16 Oct 2021 06:17    TPro  7.6  /  Alb  3.8  /  TBili  0.7  /  DBili  x   /  AST  30  /  ALT  63<H>  /  AlkPhos  131<H>  10-16              Urinalysis Basic - ( 15 Oct 2021 08:25 )    Color: Light Yellow / Appearance: Clear / S.022 / pH: x  Gluc: x / Ketone: Negative  / Bili: Negative / Urobili: Negative   Blood: x / Protein: 30 mg/dL / Nitrite: Negative   Leuk Esterase: Negative / RBC: 48 /hpf / WBC 1 /HPF   Sq Epi: x / Non Sq Epi: 1 /hpf / Bacteria: Negative        PT/INR - ( 15 Oct 2021 08:20 )   PT: 13.0 sec;   INR: 1.09 ratio         PTT - ( 15 Oct 2021 08:20 )  PTT:30.3 sec    Lactate Trend            CAPILLARY BLOOD GLUCOSE      POCT Blood Glucose.: 154 mg/dL (16 Oct 2021 09:03)        Culture Results:   Growth in anaerobic bottle: Gram positive cocci in pairs  ***Blood Panel PCR results on this specimen are available  approximately 3 hours after the Gram stain result.***  Gram stain, PCR, and/or culture results may not always  correspond due to difference in methodologies.  ************************************************************  This PCR assay was performed by multiplex PCR. This  Assay tests for 66 bacterial and resistance gene targets.  Please refer to the [facility] test directory  at [email or web address] for details. (10-15 @ 13:37)  < from: MR Lumbar Spine w/wo IV Cont (10.15.21 @ 23:01) >    IMPRESSION: Abnormal signal involving the dorsal epidural space involving the thoracic and upper lumbar spine region. The entire extent of this finding is not demonstrated on this study, contrast enhanced MR of the thoracic spine is recommended for further evaluation. There is evidence of adjacent spinal cord compression as described above.    < end of copied text >    < from: CT Abdomen and Pelvis No Cont (10.15.21 @ 09:33) >    IMPRESSION:  No hydronephrosis. Bilateral nonobstructing renal calculi measuring up to 3 mm. A 3 mm calculus in the dependent inferior bladder may represent a recently passed stone.    < end of copied text >    < from: Xray Chest 1 View- PORTABLE-Urgent (10.15.21 @ 07:54) >    IMPRESSION: Clear lungs.    < end of copied text >    EKG SR NSST/T PVC

## 2021-10-16 NOTE — DISCHARGE NOTE PROVIDER - HOSPITAL COURSE
51 year old male with PMH HTN, HLD, DM presents with back pain x 5 days. Pt reports gradual onset, non-radiating, bilateral low back pain, worse with movement. MRI suspicious for epidural abscess. Blood culture growing group B strep, unclear source. #Group B Strep Bacteremia - unclear source, notes dental extraction in late August. No history of skin/soft tissue infection, no ulcers or phlebitis on exam. Urinalysis negative, CT A/P only notable for b/l non-obstructing 3mm renal calculi. #Epidural Abscess - MRI LS showing abnormal signal in posterior dorsal space, concerning for epidural abscess. No neuro deficits at this time, ortho consulted, possible intervention pending MRI cervical/thoracic spine. Pt to be transferred to [facility] for urgent decompression with Dr. Marcelo. Reddy Marcelo and Tyreecu aware. 51 year old male with PMH HTN, HLD, DM presents with back pain x 5 days. Pt reports gradual onset, non-radiating, bilateral low back pain, worse with movement. MRI suspicious for epidural abscess. Blood culture growing group B strep, unclear source. #Group B Strep Bacteremia - unclear source, notes dental extraction in late August. No history of skin/soft tissue infection, no ulcers or phlebitis on exam. Urinalysis negative, CT A/P only notable for b/l non-obstructing 3mm renal calculi. #Epidural Abscess - MRI LS showing abnormal signal in posterior dorsal space, concerning for epidural abscess. Blood culture growing group B strep, unclear source. No history of skin/soft tissue infection, no ulcers or phlebitis on exam. Ortho cx patient sp T7-T12 laminectomy, irrigation & debridement. Phlegmon present over cord T7-T12 which was debrided and evacuated, will continue Ceftriaxone 2gm via Right arm PICC line for a total of 42 days. To follow up with Dr. Marcelo in 2 weeks and ID Dr. Marroquin in 1 month.

## 2021-10-16 NOTE — ED CDU PROVIDER SUBSEQUENT DAY NOTE - ATTENDING CONTRIBUTION TO CARE
Attending Statement (SERGIO Young MD):    HPI: 52y/o M with h/o HTN HLD DM c/o low back pain for the past 6 days; presented yesterday to the ED for this, and was found to be febrile.  In ED, work up notable for WBC 11.70, Na 130, UA +Blood +Glucose, RVP negative, CXR negative for acute pathology, CT abdomen "No hydronephrosis. Bilateral non-obstructing renal calculi measuring up to 3 mm. A 3 mm calculus in the dependent inferior bladder may represent a recently passed stone."  concern for possible urinary source and was started on Ceftriaxone; possibly related to recently passed stone; however was continuing to endorse back pain, so sent to CDU for MRI L-spine and neuro-checks in CDU.    Review of Systems:  -General: +fever (in ED last night)  -ENT: no congestion, no difficulty swallowing  -Pulmonary: no cough, no shortness of breath  -Cardiac: no chest pain, no palpitations  -Gastrointestinal: no abdominal pain, no nausea, no vomiting, and no diarrhea.  -Genitourinary: no blood or pain with urination  -Musculoskeletal: +back pain  -Skin: no rashes  -Endocrine: +h/o diabetes   -Neurologic: No new weakness or numbness in extremities    All else negative unless otherwise specified elsewhere in this note.    PSH/PMH as noted above    On Physical Exam:  General: well appearing, in NAD, speaking clearly in full sentences and without difficulty; cooperative with exam  HEENT: anicteric, airway patent  Neck: no neck tenderness, no nuchal rigidity  Abdomen: soft nontender/nondistended  : no bladder tenderness or distension  Back: no specific T/L spine tenderness; no CVA tenderness b/l  Skin: intact, no rash  Extremities: no peripheral edema, no gross deformities  Neuro: no gross neurologic deficits; 5/5 str in hips/knees/ankes; no areas of sensation loss reported.    MDM:  52y/o M with h/o HTN HLD DM c/o low back pain for the past 6 days; found to be febrile; MRI obtained in CDU and shows evidence of epidural abscess (vs neoplasm, but infectious etiology more likely given fever); Called by core lab: 1 positive blood culture (anaerobic bottle; gram + cocci in pairs).  MRI report now available, concerning for possible epidural abscess.  Ortho/spine consulted, to see patient, ESR/CRP added on to AM labs, and additional broad spectrum antibiotics ordered (vanc/zosyn). To be admitted.

## 2021-10-16 NOTE — DISCHARGE NOTE PROVIDER - CARE PROVIDERS DIRECT ADDRESSES
[email or web address]

## 2021-10-16 NOTE — DISCHARGE NOTE PROVIDER - CARE PROVIDER_API CALL
Nav Marcelo)  Orthopaedic Surgery  [address], [address]  [place] [address]  Phone: [phone]  Fax: [phone]  Follow Up Time:    Nav Marcelo)  Orthopaedic Surgery  [address], [address]  [place] [address]  Phone: [phone]  Fax: [phone]  Follow Up Time:     Geoff Marroquin; MBBS)  Infectious Disease; Internal Medicine  [address] [place] [id number]  Phone: [phone]  Fax: [phone]  Follow Up Time:

## 2021-10-16 NOTE — CONSULT NOTE ADULT - ASSESSMENT
INCOMPLETE NOTE 50 y/o M PMHx T2DM and HTN presents with lower back pain x 5 days. MRI suspicious for epidural abscess. Blood culture growing group B strep, unclear source.     #Group B Strep Bacteremia - unclear source, notes dental extraction in late August. No history of skin/soft tissue infection, no ulcers or phlebitis on exam. Urinalysis negative, CT A/P no obvious source, only notable for non-obstructing 3mm renal calculi.   #Epidural Abscess - MRI LS showing abnormal signal in posterior dorsal space, concerning for epidural abscess. No neuro deficits at this time, ortho consulted, possible intervention pending MRI cervical/thoracic spine.    Recs:  pending 52 y/o M PMHx T2DM and HTN presents with lower back pain x 5 days. MRI suspicious for epidural abscess. Blood culture growing group B strep, unclear source.     #Group B Strep Bacteremia - unclear source, notes dental extraction in late August. No history of skin/soft tissue infection, no ulcers or phlebitis on exam. Urinalysis negative, CT A/P only notable for b/l non-obstructing 3mm renal calculi.   #Epidural Abscess - MRI LS showing abnormal signal in posterior dorsal space, concerning for epidural abscess. No neuro deficits at this time, ortho consulted, possible intervention pending MRI cervical/thoracic spine.    Recs:  - d/c vanc/zosyn  - start ceftriaxone 2g q24h  - send urine culture  - HIV screen  - check TTE      Loki Duckworth MD PGY-4  Infectious Disease Fellow  Pager: [phone]  Before 9AM or after 5PM: [phone]   50 y/o M PMHx T2DM and HTN presents with lower back pain x 5 days. MRI suspicious for epidural abscess. Blood culture growing group B strep, unclear source.     #Group B Strep Bacteremia - unclear source, notes dental extraction in late August. No history of skin/soft tissue infection, no ulcers or phlebitis on exam. Urinalysis negative, CT A/P only notable for b/l non-obstructing 3mm renal calculi.   #Epidural Abscess - MRI LS showing abnormal signal in posterior dorsal space, concerning for epidural abscess. No neuro deficits at this time, ortho consulted, possible intervention pending MRI cervical/thoracic spine.    Recs:  - d/c vanc/zosyn  - start ceftriaxone 2g q24h  - send urine culture  - HIV screen  - check TTE  - f/u ortho spine for possible intervention      oLki Duckworth MD PGY-4  Infectious Disease Fellow  Pager: [phone]  Before 9AM or after 5PM: [phone]

## 2021-10-16 NOTE — DISCHARGE NOTE PROVIDER - NSDCFUADDAPPT_GEN_ALL_CORE_FT
-weekly cbc, bun/creatinine, esr, crp - fax [phone]  -f/u in ID office @ [phone] in 1 month on DC

## 2021-10-16 NOTE — ED ADULT NURSE REASSESSMENT NOTE - NS ED NURSE REASSESS COMMENT FT1
Patient being transported to MRI  with transport personnel. Patient stable upon leaving floor.
Pt transported to OR at this time. Wife at bedside took ALL PATIENT BELONGINGS.
turkey sandwich and ginger ale provided to pt.
Report received from HILARIO scruggs  Pt AAOx4, NAD, resp nonlabored, skin warm/dry, resting comfortably in bed  . Pt denies headache, dizziness, chest pain, palpitations, SOB, abd pain, n/v/d, urinary symptoms, fevers, chills, weakness at this time. Pt awaiting for admission  . Safety maintained with call bell within reach.
18:30 - patient arrived onto CDU from pink side, report received from CRYSTAL Sanchez.  Patient is alert and oriented x 4, strong upper and lower extremity.  Here for lower back pain, patient has an unsteady gait and requires 1 assist to stand, walk, and toilet.  Urinal was given to the patient to minimize movement.  Patient states he currently has pain, pain rating 4 out of 10.  Mostly received morphine in the ED, so patient was given multiple warm packs to help alleviate the pain.  Denies nausea, vomiting, and chest pain.  Patient call bell was placed within reach and patient was educated on the importance of notifying staff when in need.  Patient verbalizes understanding.
Received pt from CRYSTAL Vazquez , received pt alert and responsive, oriented x4, denies any respiratory distress, SOB, or difficulty breathing. Pt transferred to CDU for c/o lower back pain currently 4/10 pt declining pain medication at this time, neuro check completed.  Pt currently febrile, tachycardic hr: 113 medicated with PO Tylenol 975mg and normal saline bolus, pt is well appearing, no other complaints at this time, ANDREW Hood aware.  IV in place, patent and free of signs of infiltration,  pt denies chest pain or palpitations, V/S stable, Pt educated on unit and unit rules, instructed patient to notify RN of any needed assistance, Pt verbalizes understanding, Call bell placed within reach. Safety maintained. Will continue to monitor.

## 2021-10-16 NOTE — H&P ADULT - ASSESSMENT
51 m with    Back pain/  Epidural abscess  - panculture  - antibiotic  - pain control  - MRI T spine  - Ortho spine evaluation  - ID evaluation  - may need decompression    Diabetes  - ADA diet  - BS control    HTN  - control    DVT prophylaxis  - PAS    No acute medical condition identified to postpone possible surgical intervention.     Further action as per clinical course     Sukhjinder Correia MD pager [phone]

## 2021-10-16 NOTE — DISCHARGE NOTE PROVIDER - NSDCMRMEDTOKEN_GEN_ALL_CORE_FT
atorvastatin 20 mg oral tablet: 1 tab(s) orally once a day (at bedtime)  cefTRIAXone: 2 gram(s) intravenous once a day  Daily Multi oral tablet: 1 tab(s) orally once a day  hydroCHLOROthiazide 25 mg oral tablet: 1 tab(s) orally once a day  insulin lispro 100 units/mL injectable solution: injectable 3 times a day (before meals)  1 Unit(s) if Glucose 151 - 200  2 Unit(s) if Glucose 201 - 250  3 Unit(s) if Glucose 251 - 300  4 Unit(s) if Glucose 301 - 350  5 Unit(s) if Glucose 351 - 400  6 Unit(s) if Glucose Greater Than 400  insulin lispro 100 units/mL injectable solution: injectable once a day (at bedtime)  0 Unit(s) if Glucose 0 - 250  1 Unit(s) if Glucose 251 - 300  2 Unit(s) if Glucose 301 - 350  3 Unit(s) if Glucose 351 - 400  4 Unit(s) if Glucose Greater Than 400  losartan 100 mg oral tablet: 1 tab(s) orally once a day  metoprolol succinate 100 mg oral tablet, extended release: 1 tab(s) orally once a day   atorvastatin 20 mg oral tablet: 1 tab(s) orally once a day (at bedtime)  cefTRIAXone: 2 gram(s) intravenous once a day  Daily Multi oral tablet: 1 tab(s) orally once a day  hydroCHLOROthiazide 25 mg oral tablet: 1 tab(s) orally once a day  insulin lispro 100 units/mL injectable solution: injectable 3 times a day (before meals)  1 Unit(s) if Glucose 151 - 200  2 Unit(s) if Glucose 201 - 250  3 Unit(s) if Glucose 251 - 300  4 Unit(s) if Glucose 301 - 350  5 Unit(s) if Glucose 351 - 400  6 Unit(s) if Glucose Greater Than 400  insulin lispro 100 units/mL injectable solution: injectable once a day (at bedtime)  0 Unit(s) if Glucose 0 - 250  1 Unit(s) if Glucose 251 - 300  2 Unit(s) if Glucose 301 - 350  3 Unit(s) if Glucose 351 - 400  4 Unit(s) if Glucose Greater Than 400  losartan 100 mg oral tablet: 1 tab(s) orally once a day  metoprolol succinate 100 mg oral tablet, extended release: 1 tab(s) orally once a day  rolling walker :    atorvastatin 20 mg oral tablet: 1 tab(s) orally once a day (at bedtime)  cefTRIAXone: 2 gram(s) intravenous once a day  cefTRIAXone 2 g injection: 2 gram(s) intravenously once a day   Daily Multi oral tablet: 1 tab(s) orally once a day  hydroCHLOROthiazide 25 mg oral tablet: 1 tab(s) orally once a day  insulin lispro 100 units/mL injectable solution: injectable 3 times a day (before meals)  1 Unit(s) if Glucose 151 - 200  2 Unit(s) if Glucose 201 - 250  3 Unit(s) if Glucose 251 - 300  4 Unit(s) if Glucose 301 - 350  5 Unit(s) if Glucose 351 - 400  6 Unit(s) if Glucose Greater Than 400  insulin lispro 100 units/mL injectable solution: injectable once a day (at bedtime)  0 Unit(s) if Glucose 0 - 250  1 Unit(s) if Glucose 251 - 300  2 Unit(s) if Glucose 301 - 350  3 Unit(s) if Glucose 351 - 400  4 Unit(s) if Glucose Greater Than 400  losartan 100 mg oral tablet: 1 tab(s) orally once a day  metoprolol succinate 100 mg oral tablet, extended release: 1 tab(s) orally once a day  rolling walker :   Saline flushes to PICC as per protocol:   Weekly Labs: 1   once a week    acetaminophen 325 mg oral tablet: 3 tab(s) orally every 8 hours  atorvastatin 20 mg oral tablet: 1 tab(s) orally once a day (at bedtime)  cefTRIAXone: 2 gram(s) intravenous once a day  Daily Multi oral tablet: 1 tab(s) orally once a day  hydroCHLOROthiazide 25 mg oral tablet: 1 tab(s) orally once a day  Hyzaar 100 mg-12.5 mg oral tablet: 1 tab(s) orally once a day  losartan 100 mg oral tablet: 1 tab(s) orally once a day  metFORMIN 1000 mg oral tablet: 1 milligram(s) orally 2 times a day  metoprolol succinate 100 mg oral tablet, extended release: 1 tab(s) orally once a day  rolling walker :   Saline flushes to PICC as per protocol:   senna oral tablet: 2 tab(s) orally once a day (at bedtime)  Weekly Labs: 1   once a week    acetaminophen 325 mg oral tablet: 3 tab(s) orally every 8 hours  atorvastatin 20 mg oral tablet: 1 tab(s) orally once a day (at bedtime)  cefTRIAXone: 2 gram(s) intravenous once a day  cyclobenzaprine 10 mg oral tablet: 1 tab(s) orally every 8 hours, As needed, Muscle Spasm MDD:One dose daily  Daily Multi oral tablet: 1 tab(s) orally once a day  hydroCHLOROthiazide 25 mg oral tablet: 1 tab(s) orally once a day  Hyzaar 100 mg-12.5 mg oral tablet: 1 tab(s) orally once a day  losartan 100 mg oral tablet: 1 tab(s) orally once a day  metFORMIN 1000 mg oral tablet: 1 milligram(s) orally 2 times a day  metoprolol succinate 100 mg oral tablet, extended release: 1 tab(s) orally once a day  Outpatient Physical Therapy :   oxyCODONE 10 mg oral tablet: 1 tab(s) orally every 4 hours, As Needed - for severe pain MDD:No more then 6 doses daily  rolling walker :   Saline flushes to PICC as per protocol:   senna oral tablet: 2 tab(s) orally once a day (at bedtime)  Weekly Labs: 1   once a week

## 2021-10-16 NOTE — CONSULT NOTE ADULT - ATTENDING COMMENTS
51M with DM with fever, leukocytosis, GBS strep bacteremia, thoracolumbar epidural abscess, sepsis  -DC vanco, zosyn  -CTX 2 gm iv q24  -repeat blood cx  -for MRI thoracic/cervical spine also   -check HIV test  -DM control  -?source - no s/s of UTI/skin or soft tissue infection  -h/o dental work last month - was on amoxicillin post procedure  -check TTE  -appreciate Ortho spine evaluation   -will need picc and long term iv abx eventually   -monitor temps and wbc    Geoff Marroquin  Attending Physician   Division of Infectious Disease  Pager #[phone]  Available on [employer] Teams also  After 5pm/weekend or no response, call #[phone]    D/w Dr. Correia

## 2021-10-16 NOTE — H&P ADULT - NSHPREVIEWOFSYSTEMS_GEN_ALL_CORE
REVIEW OF SYSTEMS:    CONSTITUTIONAL: No weakness, fevers or chills  EYES/ENT: No visual changes;  No vertigo or throat pain   NECK: No pain or stiffness  RESPIRATORY: No cough, wheezing, hemoptysis; No shortness of breath  CARDIOVASCULAR: No chest pain or palpitations  GASTROINTESTINAL: No abdominal or epigastric pain. No nausea, vomiting, or hematemesis; No diarrhea or constipation. No melena or hematochezia.  GENITOURINARY: No dysuria, frequency or hematuria  NEUROLOGICAL: No numbness or weakness + Back pain  SKIN: No itching, burning, rashes, or lesions   All other review of systems is negative unless indicated above.

## 2021-10-16 NOTE — ED CDU PROVIDER DISPOSITION NOTE - NSFOLLOWUPINSTRUCTIONS_ED_ALL_ED_FT
Hydrate.     Please take Tylenol 650mg and Motrin 600mg every 6 hours as needed for pain. For breakthrough/severe pain you can take Oxycodone 5mg every 6 hours. THIS MEDICATION CAN MAKE YOU DROWSY, PLEASE DO NOT DRIVE ON THIS MEDICATION.    Please start taking Cefpodoxime, this medication is an antibiotic.     You may be contacted by our Emergency Department Referrals Coordinator to set up your follow up appointment within 24-48 hours of your discharge Monday- Friday. We recommend you follow up with your primary care provider within the next 2-3 days, please bring all of your results with you. Follow up with the [facility] by calling [phone].    Please return to the Emergency Department with new, worsening, or concerning symptoms, such as:  -Shortness of breath or trouble breathing  -Pressure, pain, tightness in chest  -Facial drooping, arm weakness, or speech difficulty   -Head injury or loss of consciousness   -Nonstop bleeding or an open wound     *More detailed information regarding your visit and discharge can be found by reviewing this packet

## 2021-10-16 NOTE — H&P ADULT - NSHPSOCIALHISTORY_GEN_ALL_CORE
Social History:    Marital Status:  ( x  )    (   ) Single    (   )    (  )   Occupation:   Lives with: (  ) alone  (  ) children   ( x ) spouse   (  ) parents  (  ) other    Substance Use (street drugs): ( x ) never used  (  ) other:  Tobacco Usage:  (x   ) never smoked   (   ) former smoker   (   ) current smoker  (     ) pack years  (        ) last cigarette date  Alcohol Usage: denies    (     ) Advanced Directives: (     ) None    (      ) DNR    (     ) DNI    (     ) Health Care Proxy:

## 2021-10-16 NOTE — ED ADULT NURSE REASSESSMENT NOTE - COMFORT CARE
meal provided
assisted to bedpan/darkened lights/meal provided/plan of care explained/po fluids offered
plan of care explained/po fluids offered/repositioned/warm blanket provided
Scalpel Size: 15C blade

## 2021-10-16 NOTE — ED CDU PROVIDER SUBSEQUENT DAY NOTE - PHYSICAL EXAMINATION
GEN: Well Appearing, Nontoxic, NAD  HEENT: NC/AT, Symm Facies. EOMI  CV: No JVD/Bruits or stridor;  +S1S2, RRR w/o m/g/r  RESP: CTAB w/o w/r/r  ABD: Soft, nt/nd, +BS. No guarding/rebound. No RUQ tender, no CVAT  EXT/MSK: No lower extremity edema or calf tenderness. No midline spinal tenderness.  SKIN: No erythema, lesions or rash  Neuro: Grossly intact, AOX3 with normal speech, strength equal b/l UE/LE 5/5. No pronator drift. Negative straight leg raise. Sensation grossly equal b/l UE/LE.

## 2021-10-16 NOTE — ED CDU PROVIDER DISPOSITION NOTE - CLINICAL COURSE
51 year old male with PMH HTN, HLD, DM presents with low back pain x 5 days. Pt reports gradual onset, nonradiating, bilateral low back pain, worse with movement. Pt admits to chills, but denies any fevers, chest pain, shortness of breath, abdominal pain, vomiting, diarrhea, dysuria, headache, vision change, numbness, weakness, or rash. Pt admits to occasional dysuria, but denies hematuria, bowel/bladder dysfunction, or saddle anesthesias. Denies any recent injury or trauma. Pt reports taking OTC ibuprofen with little improvement, last dose yesterday. Pt reports difficulty sleeping last night due to pain. Denies any additional complaints. no recent travel.  ED course: Febrile. WBC 11.70, Na 130, UA +Blood +Glucose, RVP negative, CXR negative for acute pathology, CT abdomen "No hydronephrosis. Bilateral nonobstructing renal calculi measuring up to 3 mm. A 3 mm calculus in the dependent inferior bladder may represent a recently passed stone." Started on Ceftriaxone. Concern for L-spine pathology. Plan for pain control, MRI L-spine and neuro-checks in CDU. 51 year old male with PMH HTN, HLD, DM presents with low back pain x 5 days. Pt reports gradual onset, nonradiating, bilateral low back pain, worse with movement. Pt admits to chills, but denies any fevers, chest pain, shortness of breath, abdominal pain, vomiting, diarrhea, dysuria, headache, vision change, numbness, weakness, or rash. Pt admits to occasional dysuria, but denies hematuria, bowel/bladder dysfunction, or saddle anesthesias. Denies any recent injury or trauma. Pt reports taking OTC ibuprofen with little improvement, last dose yesterday. Pt reports difficulty sleeping last night due to pain. Denies any additional complaints. no recent travel.  ED course: Febrile. WBC 11.70, Na 130, UA +Blood +Glucose, RVP negative, CXR negative for acute pathology, CT abdomen "No hydronephrosis. Bilateral nonobstructing renal calculi measuring up to 3 mm. A 3 mm calculus in the dependent inferior bladder may represent a recently passed stone." Started on Ceftriaxone. Concern for L-spine pathology. Plan for pain control, MRI L-spine and neuro-checks in CDU.  MR L spine with possible early R paraspinal area epidural abscess around T9 region. Pt seen and evaluated by Ortho Spine- admission to medicine but keep NPO pending T spine MRI. ID paged. Case discussed with Dr. Alisa harris for admission. Given abx; Blood culture growing gram + cocci in pairs. -ANDREW Hoffmann

## 2021-10-16 NOTE — DISCHARGE NOTE PROVIDER - PROVIDER TOKENS
PROVIDER:[TOKEN:[[id number]]] PROVIDER:[TOKEN:[[id number]]],PROVIDER:[TOKEN:[[id number]]]

## 2021-10-16 NOTE — CONSULT NOTE ADULT - SUBJECTIVE AND OBJECTIVE BOX
Patient is a 51y Male who presents with back pain for 5 days. Pt has history of kidney stones and initially was thought to be a kidney stone or pyelo due to patient having fever and tachycardia in ED. Upon doing MRI epidural abscess was found. Pt states this started 5 dasy ago and has progressively worsened. He states he also felt like he was unsteady on his feet walking around yesterday. Denies HS/LOC. Denies pain/injury elsewhere. Denies numbness/tingling/paresthesias/weakness. Denies bowel/bladder incontinence. Positive fevers/chills. No other complaints at this time.    HEALTH ISSUES - PROBLEM Dx:        MEDICATIONS  (STANDING):  aspirin enteric coated 81 milliGRAM(s) Oral daily  atorvastatin 20 milliGRAM(s) Oral at bedtime  dextrose 40% Gel 15 Gram(s) Oral once  dextrose 5%. 1000 milliLiter(s) IV Continuous <Continuous>  dextrose 5%. 1000 milliLiter(s) IV Continuous <Continuous>  dextrose 50% Injectable 25 Gram(s) IV Push once  dextrose 50% Injectable 12.5 Gram(s) IV Push once  dextrose 50% Injectable 25 Gram(s) IV Push once  glucagon  Injectable 1 milliGRAM(s) IntraMuscular once  hydrochlorothiazide 25 milliGRAM(s) Oral daily  insulin lispro (ADMELOG) corrective regimen sliding scale   SubCutaneous three times a day before meals  insulin lispro (ADMELOG) corrective regimen sliding scale   SubCutaneous at bedtime  losartan 100 milliGRAM(s) Oral daily  metFORMIN 500 milliGRAM(s) Oral every 12 hours  metoprolol succinate  milliGRAM(s) Oral daily  piperacillin/tazobactam IVPB. 3.375 Gram(s) IV Intermittent once  piperacillin/tazobactam IVPB.. 3.375 Gram(s) IV Intermittent every 8 hours  sodium chloride 0.9%. 1000 milliLiter(s) IV Continuous <Continuous>  vancomycin  IVPB 1250 milliGRAM(s) IV Intermittent every 12 hours  vancomycin  IVPB          Allergies    latex (Unknown)  No Known Drug Allergies  Seafood (Rash)  shellfish (Hives; Rash)    Intolerances        PAST MEDICAL & SURGICAL HISTORY:  HTN (hypertension)    HTN (hypertension)    HLD (hyperlipidemia)    Diabetes    No significant past surgical history                              11.4   11.49 )-----------( 336      ( 16 Oct 2021 06:17 )             35.8       16 Oct 2021 06:17    135    |  97     |  14     ----------------------------<  187    4.4     |  25     |  0.95     Ca    9.3        16 Oct 2021 06:17    TPro  7.6    /  Alb  3.8    /  TBili  0.7    /  DBili  x      /  AST  30     /  ALT  63     /  AlkPhos  131    16 Oct 2021 06:17      PT/INR - ( 15 Oct 2021 08:20 )   PT: 13.0 sec;   INR: 1.09 ratio         PTT - ( 15 Oct 2021 08:20 )  PTT:30.3 sec    Urinalysis Basic - ( 15 Oct 2021 08:25 )    Color: Light Yellow / Appearance: Clear / S.022 / pH: x  Gluc: x / Ketone: Negative  / Bili: Negative / Urobili: Negative   Blood: x / Protein: 30 mg/dL / Nitrite: Negative   Leuk Esterase: Negative / RBC: 48 /hpf / WBC 1 /HPF   Sq Epi: x / Non Sq Epi: 1 /hpf / Bacteria: Negative        Vital Signs Last 24 Hrs  T(C): 37.3 (10-16-21 @ 08:18), Max: 38.6 (10-15-21 @ 19:37)  T(F): 99.2 (10-16-21 @ 08:18), Max: 101.4 (10-15-21 @ 19:37)  HR: 74 (10-16-21 @ 08:18) (61 - 112)  BP: 147/76 (10-16-21 @ 08:18) (135/81 - 169/86)  BP(mean): --  RR: 18 (10-16-21 @ 08:18) (17 - 18)  SpO2: 97% (10-16-21 @ 08:18) (96% - 98%)    Gen: NAD    Spine PE:  Skin intact  No gross deformity  No midline TTP C/T/L/S spine  No bony step offs  No paraspinal muscle ttp/hypertonicity   Negative clonus  Negative babinski  Negative saldana  + rectal tone  No saddle anesthesia    Motor:                   C5                C6              C7               C8           T1   R            5/5                5/5            5/5             5/5          5/5  L             5/5               5/5             5/5             5/5          5/5                L2             L3             L4               L5            S1  R         5/5           5/5          5/5             5/5           5/5  L          5/5          5/5           5/5             5/5           5/5    Sensory:            C5         C6         C7      C8       T1        (0=absent, 1=impaired, 2=normal, NT=not testable)  R         2            2           2        2         2  L          2            2           2        2         2               L2          L3         L4      L5       S1         (0=absent, 1=impaired, 2=normal, NT=not testable)  R         2            2            2        2        2  L          2            2           2        2         2    Imaging:  MRI L Spine - epidural abscess at upper lumbar/lower thoracic region    A/P: 51y Male with lower thoracic epidural abscess    MRI cervical/thoracic to fully evaluate abscess  May require surgery to decompress cord  NPO for possible procedure today  No DVT ppx  Please document medical clearance for possible OR today  Pain control  WBAT  Will follow  Discussed with Dr. Fozia Self, DO  PGY3, Orthopaedic Surgery

## 2021-10-16 NOTE — BRIEF OPERATIVE NOTE - OPERATION/FINDINGS
T7-T12 laminectomy, irrigation & debridement. Phlegmon present over cord T7-T12 which was debrided and evacuated

## 2021-10-16 NOTE — ED CDU PROVIDER SUBSEQUENT DAY NOTE - HISTORY
No interval changes since initial CDU provider note. Pt without complaint. NAD VSS. no events on tele. Patient had MRI performed, pending result. Will continue to monitor closely. Kelli Blake

## 2021-10-16 NOTE — ED CDU PROVIDER SUBSEQUENT DAY NOTE - NS ED ROS FT
Constitutional: No fever + chills  Eyes: No visual changes, eye pain   CV: No chest pain or lower extremity edema  Resp: No SOB no cough  GI: No abd pain. No nausea or vomiting. No diarrhea.    : + dysuria, +urinary frequency No hematuria.   MSK: +lower back pain   Skin: No rash  Psych: No complaints   Neuro: No headache. No numbness or tingling. No weakness.

## 2021-10-16 NOTE — ED CDU PROVIDER SUBSEQUENT DAY NOTE - PROGRESS NOTE DETAILS
Pt endorses improving back pain, will continue to monitor. - Sana Blake PA-C Attending note (Hector): patient improved, reports improvement in pain.  On exam, no discrete back tenderness, no CVA tenderness and no weakness/numbness in lower extremities, denies problems with urination or bowel movements.  Called by core lab: 1 positive blood culture (anaerobic bottle; gram + cocci in pairs).  MRI report now available, concerning for possible epidural abscess.  Ortho/spine consulted, to see patient, ESR/CRP added on to AM labs, and additional broad spectrum antibiotics ordered (vanc/zosyn). To be admitted.

## 2021-10-16 NOTE — ED CDU PROVIDER DISPOSITION NOTE - ATTENDING CONTRIBUTION TO CARE
Attending Statement (SERGIO Young MD):    HPI: 50y/o M with h/o HTN HLD DM c/o low back pain for the past 6 days; presented yesterday to the ED for this, and was found to be febrile.  In ED, work up notable for WBC 11.70, Na 130, UA +Blood +Glucose, RVP negative, CXR negative for acute pathology, CT abdomen "No hydronephrosis. Bilateral non-obstructing renal calculi measuring up to 3 mm. A 3 mm calculus in the dependent inferior bladder may represent a recently passed stone."  concern for possible urinary source and was started on Ceftriaxone; possibly related to recently passed stone; however was continuing to endorse back pain, so sent to CDU for MRI L-spine and neuro-checks in CDU.    Review of Systems:  -General: +fever (in ED last night)  -ENT: no congestion, no difficulty swallowing  -Pulmonary: no cough, no shortness of breath  -Cardiac: no chest pain, no palpitations  -Gastrointestinal: no abdominal pain, no nausea, no vomiting, and no diarrhea.  -Genitourinary: no blood or pain with urination  -Musculoskeletal: +back pain  -Skin: no rashes  -Endocrine: +h/o diabetes   -Neurologic: No new weakness or numbness in extremities    All else negative unless otherwise specified elsewhere in this note.    PSH/PMH as noted above    On Physical Exam:  General: well appearing, in NAD, speaking clearly in full sentences and without difficulty; cooperative with exam  HEENT: anicteric, airway patent  Neck: no neck tenderness, no nuchal rigidity  Abdomen: soft nontender/nondistended  : no bladder tenderness or distension  Back: no specific T/L spine tenderness; no CVA tenderness b/l  Skin: intact, no rash  Extremities: no peripheral edema, no gross deformities  Neuro: no gross neurologic deficits; 5/5 str in hips/knees/ankes; no areas of sensation loss reported.    MDM:  50y/o M with h/o HTN HLD DM c/o low back pain for the past 6 days; found to be febrile; MRI obtained in CDU and shows evidence of epidural abscess (vs neoplasm, but infectious etiology more likely given fever); Called by core lab: 1 positive blood culture (anaerobic bottle; gram + cocci in pairs).  MRI report now available, concerning for possible epidural abscess.  Ortho/spine consulted, to see patient, ESR/CRP added on to AM labs, and additional broad spectrum antibiotics ordered (vanc/zosyn). To be admitted.

## 2021-10-16 NOTE — CONSULT NOTE ADULT - SUBJECTIVE AND OBJECTIVE BOX
Patient is a 51y old  Male who presents with a chief complaint of   HPI:       REVIEW OF SYSTEMS  [  ] ROS unobtainable because:    [  ] All other systems negative except as noted below    Constitutional:  [ ] fever [ ] chills  [ ] weight loss  [ ]night sweat  [ ]poor appetite/PO intake [ ]fatigue   Skin:  [ ] rash [ ] phlebitis	  Eyes: [ ] icterus [ ] pain  [ ] discharge	  ENMT: [ ] sore throat  [ ] thrush [ ] ulcers [ ] exudates [ ]anosmia  Respiratory: [ ] dyspnea [ ] hemoptysis [ ] cough [ ] sputum	  Cardiovascular:  [ ] chest pain [ ] palpitations [ ] edema	  Gastrointestinal:  [ ] nausea [ ] vomiting [ ] diarrhea [ ] constipation [ ] pain	  Genitourinary:  [ ] dysuria [ ] frequency [ ] hematuria [ ] discharge [ ] flank pain  [ ] incontinence  Musculoskeletal:  [ ] myalgias [ ] arthralgias [ ] arthritis  [ ] back pain  Neurological:  [ ] headache [ ] weakness [ ] seizures  [ ] confusion/altered mental status    prior hospital charts reviewed [V]  primary team notes reviewed [V]  other consultant notes reviewed [V]    PAST MEDICAL & SURGICAL HISTORY:  HTN (hypertension)    HTN (hypertension)    HLD (hyperlipidemia)    Diabetes    No significant past surgical history        FAMILY HISTORY:      SOCIAL HISTORY:  - Denied smoking/vaping/alcohol/recreational drug use    Allergies  latex (Unknown)  No Known Drug Allergies  Seafood (Rash)  shellfish (Hives; Rash)        ANTIMICROBIALS:  piperacillin/tazobactam IVPB. 3.375 once  piperacillin/tazobactam IVPB.. 3.375 every 8 hours  vancomycin  IVPB 1250 every 12 hours  vancomycin  IVPB        ANTIMICROBIALS (past 90 days):   MEDICATIONS  (STANDING):  cefTRIAXone   IVPB   100 mL/Hr IV Intermittent (10-15-21 @ 10:03)    cefTRIAXone   IVPB   100 mL/Hr IV Intermittent (10-15-21 @ 23:30)    vancomycin  IVPB   166.67 mL/Hr IV Intermittent (10-16-21 @ 09:05)        MEDICATIONS  (STANDING):  atorvastatin 20 at bedtime  dextrose 40% Gel 15 once  dextrose 50% Injectable 25 once  dextrose 50% Injectable 12.5 once  dextrose 50% Injectable 25 once  glucagon  Injectable 1 once  hydrochlorothiazide 25 daily  insulin lispro (ADMELOG) corrective regimen sliding scale  three times a day before meals  insulin lispro (ADMELOG) corrective regimen sliding scale  at bedtime  losartan 100 daily  metFORMIN 500 every 12 hours  metoprolol succinate  daily      VITALS:  Vital Signs Last 24 Hrs  T(F): 99.2 (10-16-21 @ 08:18), Max: 101.4 (10-15-21 @ 19:37)  Vital Signs Last 24 Hrs  HR: 74 (10-16-21 @ 08:18) (61 - 112)  BP: 147/76 (10-16-21 @ 08:18) (135/81 - 169/86)  RR: 18 (10-16-21 @ 08:18)  SpO2: 97% (10-16-21 @ 08:18) (96% - 98%)  Wt(kg): --    PHYSICAL EXAM:  Constitutional: non-toxic, no distress  HEAD/EYES: anicteric, no conjunctival injection  ENT:  supple, no thrush  Cardiovascular:   normal S1/S2, no murmur, no edema  Respiratory:  clear BS bilaterally, no wheezes, no rales  GI:  soft, non-tender, normal bowel sounds  :  no vences, no CVA tenderness  Musculoskeletal:  no synovitis, normal ROM  Neurologic: awake and alert,  no focal findings  Skin:  no rash, no erythema, no phlebitis  Heme/Onc: no lymphadenopathy   Psychiatric:  awake, alert, appropriate mood      Labs:                        11.4   11.49 )-----------( 336      ( 16 Oct 2021 06:17 )             35.8     10-16    135  |  97  |  14  ----------------------------<  187<H>  4.4   |  25  |  0.95    Ca    9.3      16 Oct 2021 06:17    TPro  7.6  /  Alb  3.8  /  TBili  0.7  /  DBili  x   /  AST  30  /  ALT  63<H>  /  AlkPhos  131<H>  10-16      WBC Trend:  WBC Count: 11.49 (10-16-21 @ 06:17)  WBC Count: 12.53 (10-15-21 @ 21:21)  WBC Count: 11.70 (10-15-21 @ 08:20)    Auto Neutrophil #: 7.80 K/uL (10-16-21 @ 06:17)  Auto Neutrophil #: 8.85 K/uL (10-15-21 @ 21:21)  Auto Neutrophil #: 8.60 K/uL (10-15-21 @ 08:20)    Auto Eosinophil %: 0.6 % (10-16-21 @ 06:17)  Auto Eosinophil %: 0.2 % (10-15-21 @ 21:21)  Auto Eosinophil %: 0.4 % (10-15-21 @ 08:20)    Urinalysis Basic - ( 15 Oct 2021 08:25 )    Color: Light Yellow / Appearance: Clear / S.022 / pH: x  Gluc: x / Ketone: Negative  / Bili: Negative / Urobili: Negative   Blood: x / Protein: 30 mg/dL / Nitrite: Negative   Leuk Esterase: Negative / RBC: 48 /hpf / WBC 1 /HPF   Sq Epi: x / Non Sq Epi: 1 /hpf / Bacteria: Negative        MICROBIOLOGY:        Culture - Blood (collected 15 Oct 2021 13:37)  Source: .Blood Blood-Peripheral  Preliminary Report:    Growth in anaerobic bottle: Gram positive cocci in pairs    ***Blood Panel PCR results on this specimen are available    approximately 3 hours after the Gram stain result.***    Gram stain, PCR, and/or culture results may not always    correspond due to difference in methodologies.    ************************************************************    This PCR assay was performed by multiplex PCR. This    Assay tests for 66 bacterial and resistance gene targets.    Please refer to the [facility] test directory    at [email or web address] for details.    Culture - Urine (collected 22 Aug 2020 01:08)  Source: .Urine Clean Catch (Midstream)  Final Report:    No growth                        Rapid RVP Result: NotDetec (10-15 @ 08:37)            RADIOLOGY:  imaging below personally reviewed   Patient is a 51y old  Male who presents with a chief complaint of   HPI:  51M PMHx DM and HTN presents with back pain x 5 days. Denied fever/chills, skin rash, erythema, ulcerations, abdominal pain, dysuria, diarrhea, N/V, SOB, or cough. Had dental extraction in late August. No prior surgeries, no hardware/prosthetic devices. Born in [facility], lives with wife and child. No IVDU, tobacco, or etoh. No pets. Works as [employer] in [employer]. No sick contacts    REVIEW OF SYSTEMS  [ x ] ROS unobtainable because:    [  ] All other systems negative except as noted below    Constitutional:  [ ] fever [ ] chills  [ ] weight loss  [ ]night sweat  [ ]poor appetite/PO intake [ ]fatigue   Skin:  [ ] rash [ ] phlebitis	  Eyes: [ ] icterus [ ] pain  [ ] discharge	  ENMT: [ ] sore throat  [ ] thrush [ ] ulcers [ ] exudates [ ]anosmia  Respiratory: [ ] dyspnea [ ] hemoptysis [ ] cough [ ] sputum	  Cardiovascular:  [ ] chest pain [ ] palpitations [ ] edema	  Gastrointestinal:  [ ] nausea [ ] vomiting [ ] diarrhea [ ] constipation [ ] pain	  Genitourinary:  [ ] dysuria [ ] frequency [ ] hematuria [ ] discharge [ ] flank pain  [ ] incontinence  Musculoskeletal:  [ ] myalgias [ ] arthralgias [ ] arthritis  [ ] back pain  Neurological:  [ ] headache [ ] weakness [ ] seizures  [ ] confusion/altered mental status    prior hospital charts reviewed [V]  primary team notes reviewed [V]  other consultant notes reviewed [V]    PAST MEDICAL & SURGICAL HISTORY:  HTN (hypertension)    HTN (hypertension)    HLD (hyperlipidemia)    Diabetes    No significant past surgical history        FAMILY HISTORY:  Family hx DM in mother    SOCIAL HISTORY:  - Denied smoking/vaping/alcohol/recreational drug use    Allergies  latex (Unknown)  No Known Drug Allergies  Seafood (Rash)  shellfish (Hives; Rash)        ANTIMICROBIALS:  piperacillin/tazobactam IVPB. 3.375 once  piperacillin/tazobactam IVPB.. 3.375 every 8 hours  vancomycin  IVPB 1250 every 12 hours  vancomycin  IVPB        ANTIMICROBIALS (past 90 days):   MEDICATIONS  (STANDING):  cefTRIAXone   IVPB   100 mL/Hr IV Intermittent (10-15-21 @ 10:03)    cefTRIAXone   IVPB   100 mL/Hr IV Intermittent (10-15-21 @ 23:30)    vancomycin  IVPB   166.67 mL/Hr IV Intermittent (10-16-21 @ 09:05)        MEDICATIONS  (STANDING):  atorvastatin 20 at bedtime  dextrose 40% Gel 15 once  dextrose 50% Injectable 25 once  dextrose 50% Injectable 12.5 once  dextrose 50% Injectable 25 once  glucagon  Injectable 1 once  hydrochlorothiazide 25 daily  insulin lispro (ADMELOG) corrective regimen sliding scale  three times a day before meals  insulin lispro (ADMELOG) corrective regimen sliding scale  at bedtime  losartan 100 daily  metFORMIN 500 every 12 hours  metoprolol succinate  daily      VITALS:  Vital Signs Last 24 Hrs  T(F): 99.2 (10-16-21 @ 08:18), Max: 101.4 (10-15-21 @ 19:37)  Vital Signs Last 24 Hrs  HR: 74 (10-16-21 @ 08:18) (61 - 112)  BP: 147/76 (10-16-21 @ 08:18) (135/81 - 169/86)  RR: 18 (10-16-21 @ 08:18)  SpO2: 97% (10-16-21 @ 08:18) (96% - 98%)  Wt(kg): --    PHYSICAL EXAM:  Constitutional: non-toxic, no distress  HEAD/EYES: anicteric, no conjunctival injection  ENT:  supple, no thrush  Cardiovascular:   normal S1/S2, no murmur, no edema  Respiratory:  clear BS bilaterally, no wheezes, no rales  GI:  soft, non-tender, normal bowel sounds  :  no vences, no CVA tenderness  Musculoskeletal:  no spinal tenderness, normal ROM  Neurologic: awake and alert,  no focal findings  Skin:  no rash, no erythema, no phlebitis  Heme/Onc: no lymphadenopathy   Psychiatric:  awake, alert, appropriate mood      Labs:                        11.4   11.49 )-----------( 336      ( 16 Oct 2021 06:17 )             35.8     10-    135  |  97  |  14  ----------------------------<  187<H>  4.4   |  25  |  0.95    Ca    9.3      16 Oct 2021 06:17    TPro  7.6  /  Alb  3.8  /  TBili  0.7  /  DBili  x   /  AST  30  /  ALT  63<H>  /  AlkPhos  131<H>  10-16      WBC Trend:  WBC Count: 11.49 (10-16-21 @ 06:17)  WBC Count: 12.53 (10-15-21 @ 21:21)  WBC Count: 11.70 (10-15-21 @ 08:20)    Auto Neutrophil #: 7.80 K/uL (10-16-21 @ 06:17)  Auto Neutrophil #: 8.85 K/uL (10-15-21 @ 21:21)  Auto Neutrophil #: 8.60 K/uL (10-15-21 @ 08:20)    Auto Eosinophil %: 0.6 % (10-16-21 @ 06:17)  Auto Eosinophil %: 0.2 % (10-15-21 @ 21:21)  Auto Eosinophil %: 0.4 % (10-15-21 @ 08:20)    Urinalysis Basic - ( 15 Oct 2021 08:25 )    Color: Light Yellow / Appearance: Clear / S.022 / pH: x  Gluc: x / Ketone: Negative  / Bili: Negative / Urobili: Negative   Blood: x / Protein: 30 mg/dL / Nitrite: Negative   Leuk Esterase: Negative / RBC: 48 /hpf / WBC 1 /HPF   Sq Epi: x / Non Sq Epi: 1 /hpf / Bacteria: Negative        MICROBIOLOGY:        Culture - Blood (collected 15 Oct 2021 13:37)  Source: .Blood Blood-Peripheral  Preliminary Report:    Growth in anaerobic bottle: Gram positive cocci in pairs    ***Blood Panel PCR results on this specimen are available    approximately 3 hours after the Gram stain result.***    Gram stain, PCR, and/or culture results may not always    correspond due to difference in methodologies.    ************************************************************    This PCR assay was performed by multiplex PCR. This    Assay tests for 66 bacterial and resistance gene targets.    Please refer to the [facility] test directory    at [email or web address] for details.    Culture - Urine (collected 22 Aug 2020 01:08)  Source: .Urine Clean Catch (Midstream)  Final Report:    No growth      Rapid RVP Result: NotDetec (10-15 @ 08:37)            RADIOLOGY:  imaging below personally reviewed    < from: MR Lumbar Spine w/wo IV Cont (10.15.21 @ 23:01) >    EXAM:  MR SPINE LUMBAR WAW IC                            PROCEDURE DATE:  10/15/2021            INTERPRETATION:  Clinical indication: Lower back pain radiating down the right leg. Fever.    MRI of the lumbar spine was performed using sagittal T1-T2and T2-weighted sequences fat suppression. Axial T1 and T2-weighted sequences were performed as well. The patient was injected with approximately 10 cc gadavist IV and sagittal T1-weighted sequence was performed with fat suppression. Axial T1 weighted sequences were performed.    Loss of the normal lumbar lordosis is seen.    The vertebral body height alignment and marrow signal appear normal.    Mild disc desiccation is seen involving the L5-S1 level which is secondary to degenerative changes. There is evidence of abnormal signal seen involving the dorsal epidural space. This is not entirely demonstrated on this study, since this does extend up into the thoracic spine region. The visualized portions of this finding on this study extends fromT9-L1. This is more prominent on the right side than the left and does demonstrate a fluid component as well as a solid peripherally in enhancing component. This finding measures approximately 0.8 x 1.0 (AP and transverse) and at least 8.9 cm and likely higher (craniocaudal). This could be compatible with an epidural abscess, though the possibility of a neoplastic process cannot be entirely excluded as well. This finding does appear to cause compression of the dorsal aspect of the thecal sac and spinal cord most prominent at the T11 level. Normal signal is identified in the visualized portion of the spinal cord at this time. There is also evidence of prominent paraspinal soft tissue component seen at the T10 and T11 levels. Possible early abscess is seen involving the right paraspinal region at the T10 level (series  image 1) contrast enhanced MR of the thoracic spine is recommended to better evaluate this finding.    L1-2: Normal.    L2-3: Normal    L3-4: Normal    L4-5: Bilateral hypertrophic facet joint changes are seen. No significant compromise of the spinal canal or either neural foramen    L5-S1: Disc bulge and bilateral hypertrophic facet joint changes. Moderate narrowing of the right neural foramen.    The conus ends at the top of L2 and appears normal.    There is evidence of area of decreased signal seen involving the posterior aspect of the right iliac bone on all sequences. This could be compatible with a bone island though the possibility of underlying neoplastic process can't be excluded.    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    IMPRESSION: Abnormal signal involving the dorsal epidural space involving the thoracic and upper lumbar spine region. The entire extent of this finding is not demonstrated on this study, contrast enhanced MR of the thoracic spine is recommended for further evaluation. There is evidence of adjacent spinal cord compression as described above.      < end of copied text >   Patient is a 51y old  Male who presents with a chief complaint of back pain    HPI:  51M PMHx DM and HTN presents with back pain x 5 days. Denied fever/chills, skin rash, erythema, ulcerations, abdominal pain, dysuria, diarrhea, N/V, SOB, or cough. Had dental extraction in late August. No prior surgeries, no hardware/prosthetic devices. Born in [facility], lives with wife and child. No IVDU, tobacco, or etoh. No pets. Works as [employer] in [employer]. No sick contacts.    REVIEW OF SYSTEMS  [ x ] ROS unobtainable because:    [  ] All other systems negative except as noted below    Constitutional:  [ ] fever [ ] chills  [ ] weight loss  [ ]night sweat  [ ]poor appetite/PO intake [ ]fatigue   Skin:  [ ] rash [ ] phlebitis	  Eyes: [ ] icterus [ ] pain  [ ] discharge	  ENMT: [ ] sore throat  [ ] thrush [ ] ulcers [ ] exudates [ ]anosmia  Respiratory: [ ] dyspnea [ ] hemoptysis [ ] cough [ ] sputum	  Cardiovascular:  [ ] chest pain [ ] palpitations [ ] edema	  Gastrointestinal:  [ ] nausea [ ] vomiting [ ] diarrhea [ ] constipation [ ] pain	  Genitourinary:  [ ] dysuria [ ] frequency [ ] hematuria [ ] discharge [ ] flank pain  [ ] incontinence  Musculoskeletal:  [ ] myalgias [ ] arthralgias [ ] arthritis  [ ] back pain  Neurological:  [ ] headache [ ] weakness [ ] seizures  [ ] confusion/altered mental status    prior hospital charts reviewed [V]  primary team notes reviewed [V]  other consultant notes reviewed [V]    PAST MEDICAL & SURGICAL HISTORY:  HTN (hypertension)    HTN (hypertension)    HLD (hyperlipidemia)    Diabetes    No significant past surgical history        FAMILY HISTORY:  Family hx DM in mother    SOCIAL HISTORY:  - Denied smoking/vaping/alcohol/recreational drug use    Allergies  latex (Unknown)  No Known Drug Allergies  Seafood (Rash)  shellfish (Hives; Rash)        ANTIMICROBIALS:  piperacillin/tazobactam IVPB. 3.375 once  piperacillin/tazobactam IVPB.. 3.375 every 8 hours  vancomycin  IVPB 1250 every 12 hours  vancomycin  IVPB        ANTIMICROBIALS (past 90 days):   MEDICATIONS  (STANDING):  cefTRIAXone   IVPB   100 mL/Hr IV Intermittent (10-15-21 @ 10:03)    cefTRIAXone   IVPB   100 mL/Hr IV Intermittent (10-15-21 @ 23:30)    vancomycin  IVPB   166.67 mL/Hr IV Intermittent (10-16-21 @ 09:05)        MEDICATIONS  (STANDING):  atorvastatin 20 at bedtime  dextrose 40% Gel 15 once  dextrose 50% Injectable 25 once  dextrose 50% Injectable 12.5 once  dextrose 50% Injectable 25 once  glucagon  Injectable 1 once  hydrochlorothiazide 25 daily  insulin lispro (ADMELOG) corrective regimen sliding scale  three times a day before meals  insulin lispro (ADMELOG) corrective regimen sliding scale  at bedtime  losartan 100 daily  metFORMIN 500 every 12 hours  metoprolol succinate  daily      VITALS:  Vital Signs Last 24 Hrs  T(F): 99.2 (10-16-21 @ 08:18), Max: 101.4 (10-15-21 @ 19:37)  Vital Signs Last 24 Hrs  HR: 74 (10-16-21 @ 08:18) (61 - 112)  BP: 147/76 (10-16-21 @ 08:18) (135/81 - 169/86)  RR: 18 (10-16-21 @ 08:18)  SpO2: 97% (10-16-21 @ 08:18) (96% - 98%)  Wt(kg): --    PHYSICAL EXAM:  Constitutional: non-toxic, no distress  HEAD/EYES: anicteric, no conjunctival injection  ENT:  supple, no thrush  Cardiovascular:   normal S1/S2, no murmur, no edema  Respiratory:  clear BS bilaterally, no wheezes, no rales  GI:  soft, non-tender, normal bowel sounds  :  no vences, no CVA tenderness  Musculoskeletal:  no spinal tenderness, normal ROM  Neurologic: awake and alert,  no focal findings  Skin:  no rash, no erythema, no phlebitis  Heme/Onc: no lymphadenopathy   Psychiatric:  awake, alert, appropriate mood      Labs:                        11.4   11.49 )-----------( 336      ( 16 Oct 2021 06:17 )             35.8     10-    135  |  97  |  14  ----------------------------<  187<H>  4.4   |  25  |  0.95    Ca    9.3      16 Oct 2021 06:17    TPro  7.6  /  Alb  3.8  /  TBili  0.7  /  DBili  x   /  AST  30  /  ALT  63<H>  /  AlkPhos  131<H>  10-16      WBC Trend:  WBC Count: 11.49 (10-16-21 @ 06:17)  WBC Count: 12.53 (10-15-21 @ 21:21)  WBC Count: 11.70 (10-15-21 @ 08:20)    Auto Neutrophil #: 7.80 K/uL (10-16-21 @ 06:17)  Auto Neutrophil #: 8.85 K/uL (10-15-21 @ 21:21)  Auto Neutrophil #: 8.60 K/uL (10-15-21 @ 08:20)    Auto Eosinophil %: 0.6 % (10-16-21 @ 06:17)  Auto Eosinophil %: 0.2 % (10-15-21 @ 21:21)  Auto Eosinophil %: 0.4 % (10-15-21 @ 08:20)    Urinalysis Basic - ( 15 Oct 2021 08:25 )    Color: Light Yellow / Appearance: Clear / S.022 / pH: x  Gluc: x / Ketone: Negative  / Bili: Negative / Urobili: Negative   Blood: x / Protein: 30 mg/dL / Nitrite: Negative   Leuk Esterase: Negative / RBC: 48 /hpf / WBC 1 /HPF   Sq Epi: x / Non Sq Epi: 1 /hpf / Bacteria: Negative        MICROBIOLOGY:        Culture - Blood (collected 15 Oct 2021 13:37)  Source: .Blood Blood-Peripheral  Preliminary Report:    Growth in anaerobic bottle: Gram positive cocci in pairs    ***Blood Panel PCR results on this specimen are available    approximately 3 hours after the Gram stain result.***    Gram stain, PCR, and/or culture results may not always    correspond due to difference in methodologies.    ************************************************************    This PCR assay was performed by multiplex PCR. This    Assay tests for 66 bacterial and resistance gene targets.    Please refer to the [facility] test directory    at [email or web address] for details.    Culture - Urine (collected 22 Aug 2020 01:08)  Source: .Urine Clean Catch (Midstream)  Final Report:    No growth      Rapid RVP Result: NotDetec (10-15 @ 08:37)            RADIOLOGY:  imaging below personally reviewed    < from: MR Lumbar Spine w/wo IV Cont (10.15.21 @ 23:01) >    EXAM:  MR SPINE LUMBAR WAW IC                            PROCEDURE DATE:  10/15/2021            INTERPRETATION:  Clinical indication: Lower back pain radiating down the right leg. Fever.    MRI of the lumbar spine was performed using sagittal T1-T2and T2-weighted sequences fat suppression. Axial T1 and T2-weighted sequences were performed as well. The patient was injected with approximately 10 cc gadavist IV and sagittal T1-weighted sequence was performed with fat suppression. Axial T1 weighted sequences were performed.    Loss of the normal lumbar lordosis is seen.    The vertebral body height alignment and marrow signal appear normal.    Mild disc desiccation is seen involving the L5-S1 level which is secondary to degenerative changes. There is evidence of abnormal signal seen involving the dorsal epidural space. This is not entirely demonstrated on this study, since this does extend up into the thoracic spine region. The visualized portions of this finding on this study extends fromT9-L1. This is more prominent on the right side than the left and does demonstrate a fluid component as well as a solid peripherally in enhancing component. This finding measures approximately 0.8 x 1.0 (AP and transverse) and at least 8.9 cm and likely higher (craniocaudal). This could be compatible with an epidural abscess, though the possibility of a neoplastic process cannot be entirely excluded as well. This finding does appear to cause compression of the dorsal aspect of the thecal sac and spinal cord most prominent at the T11 level. Normal signal is identified in the visualized portion of the spinal cord at this time. There is also evidence of prominent paraspinal soft tissue component seen at the T10 and T11 levels. Possible early abscess is seen involving the right paraspinal region at the T10 level (series / image 1) contrast enhanced MR of the thoracic spine is recommended to better evaluate this finding.    L1-2: Normal.    L2-3: Normal    L3-4: Normal    L4-5: Bilateral hypertrophic facet joint changes are seen. No significant compromise of the spinal canal or either neural foramen    L5-S1: Disc bulge and bilateral hypertrophic facet joint changes. Moderate narrowing of the right neural foramen.    The conus ends at the top of L2 and appears normal.    There is evidence of area of decreased signal seen involving the posterior aspect of the right iliac bone on all sequences. This could be compatible with a bone island though the possibility of underlying neoplastic process can't be excluded.    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    IMPRESSION: Abnormal signal involving the dorsal epidural space involving the thoracic and upper lumbar spine region. The entire extent of this finding is not demonstrated on this study, contrast enhanced MR of the thoracic spine is recommended for further evaluation. There is evidence of adjacent spinal cord compression as described above.

## 2021-10-16 NOTE — ED ADULT NURSE REASSESSMENT NOTE - NEURO SENSATION
endorses nonspecific sensation changes to bilateral lower extremities./sensory intact
endorses nonspecific sensation changes to bilateral lower extremities.
bilateral foot decreased sensation.

## 2021-10-16 NOTE — DISCHARGE NOTE PROVIDER - NSDCCPCAREPLAN_GEN_ALL_CORE_FT
PRINCIPAL DISCHARGE DIAGNOSIS  Diagnosis: Back pain  Assessment and Plan of Treatment: MRI suspicious for epidural abscess. Blood culture growing group B strep, unclear source.  - Group B Strep Bacteremia - unclear source, notes dental extraction in late August. No history of skin/soft tissue infection, no ulcers or phlebitis on exam.   - Epidural Abscess - MRI LS showing abnormal signal in posterior dorsal space, concerning for epidural abscess. No neuro deficits at this time, ortho consulted, possible intervention pending MRI cervical/thoracic spine.   - You are now being transferred to [facility] for intervention.      SECONDARY DISCHARGE DIAGNOSES  Diagnosis: Hypertension  Assessment and Plan of Treatment: Low salt diet  Activity as tolerated.  Take all medication as prescribed.  Follow up with your medical doctor for routine blood pressure monitoring at your next visit.  Notify your doctor if you have any of the following symptoms:   Dizziness, Lightheadedness, Blurry vision, Headache, Chest pain, Shortness of breath    Diagnosis: Diabetes mellitus  Assessment and Plan of Treatment: Please continue medication regimen as instructed while in hospital.        PRINCIPAL DISCHARGE DIAGNOSIS  Diagnosis: Back pain  Assessment and Plan of Treatment: Due to Group B Strep Bacteremia - unclear source, notes dental extraction in late August. Epidural Abscess - MRI LS showing abnormal signal in posterior dorsal space, concerning for epidural abscess.  You had a T7-T12 laminectomy, irrigation & debridement. Phlegmon present over cord T7-T12 which was debrided and evacuated, you will continue Ceftriaxone 2gm via Right arm PICC line for a total of 42 days. Please follow up with Dr. Marcelo in 2 weeks and ID Dr. Marroquin in 1 month.         SECONDARY DISCHARGE DIAGNOSES  Diagnosis: Hypertension  Assessment and Plan of Treatment: Low salt diet  Activity as tolerated.  Take all medication as prescribed.  Follow up with your medical doctor for routine blood pressure monitoring at your next visit.  Notify your doctor if you have any of the following symptoms:   Dizziness, Lightheadedness, Blurry vision, Headache, Chest pain, Shortness of breath    Diagnosis: Diabetes mellitus  Assessment and Plan of Treatment: HgA1C this admission is 7.2  Make sure you get your HgA1c checked every three months.  If you take oral diabetes medications, check your blood glucose two times a day.  If you take insulin, check your blood glucose before meals and at bedtime.  It's important not to skip any meals.  Keep a log of your blood glucose results and always take it with you to your doctor appointments.  Keep a list of your current medications including injectables and over the counter medications and bring this medication list with you to all your doctor appointments.  If you have not seen your ophthalmologist this year call for appointment.  Check your feet daily for redness, sores, or openings. Do not self treat. If no improvement in two days call your primary care physician for an appointment.  Low blood sugar (hypoglycemia) is a blood sugar below 70mg/dl. Check your blood sugar if you feel signs/symptoms of hypoglycemia. If your blood sugar is below 70 take 15 grams of carbohydrates (ex 4 oz of apple juice, 3-4 glucose tablets, or 4-6 oz of regular soda) wait 15 minutes and repeat blood sugar to make sure it comes up above 70.  If your blood sugar is above 70 and you are due for a meal, have a meal.  If you are not due for a meal have a snack.  This snack helps keeps your blood sugar at a safe range.

## 2021-10-16 NOTE — PRE-ANESTHESIA EVALUATION ADULT - NSANTHOSAYNRD_GEN_A_CORE
No. TEGAN screening performed.  STOP BANG Legend: 0-2 = LOW Risk; 3-4 = INTERMEDIATE Risk; 5-8 = HIGH Risk

## 2021-10-16 NOTE — H&P ADULT - NSHPPHYSICALEXAM_GEN_ALL_CORE
PHYSICAL EXAMINATION:  Vital Signs Last 24 Hrs  T(C): 37.3 (16 Oct 2021 08:18), Max: 38.6 (15 Oct 2021 19:37)  T(F): 99.2 (16 Oct 2021 08:18), Max: 101.4 (15 Oct 2021 19:37)  HR: 74 (16 Oct 2021 08:18) (61 - 112)  BP: 147/76 (16 Oct 2021 08:18) (135/81 - 169/86)  BP(mean): --  RR: 18 (16 Oct 2021 08:18) (17 - 18)  SpO2: 97% (16 Oct 2021 08:18) (96% - 98%)  CAPILLARY BLOOD GLUCOSE      POCT Blood Glucose.: 154 mg/dL (16 Oct 2021 09:03)  POCT Blood Glucose.: 236 mg/dL (15 Oct 2021 20:33)      GENERAL: NAD, well-groomed, well-developed  HEAD:  atraumatic, normocephalic  EYES: sclera anicteric  ENMT: mucous membranes moist  NECK: supple, No JVD  CHEST/LUNG: clear to auscultation bilaterally; no rales, rhonchi, or wheezing b/l  HEART: normal S1, S2  ABDOMEN: BS+, soft, ND, NT   EXTREMITIES:  pulses palpable; no clubbing, cyanosis, or edema b/l LEs  NEURO: awake, alert, interactive; moves all extremities  SKIN: no rashes or lesions

## 2021-10-17 LAB
ANION GAP SERPL CALC-SCNC: 12 MMOL/L — SIGNIFICANT CHANGE UP (ref 5–17)
BASOPHILS # BLD AUTO: 0.02 K/UL — SIGNIFICANT CHANGE UP (ref 0–0.2)
BASOPHILS NFR BLD AUTO: 0.2 % — SIGNIFICANT CHANGE UP (ref 0–2)
BUN SERPL-MCNC: 14 MG/DL — SIGNIFICANT CHANGE UP (ref 7–23)
CALCIUM SERPL-MCNC: 8.8 MG/DL — SIGNIFICANT CHANGE UP (ref 8.4–10.5)
CHLORIDE SERPL-SCNC: 97 MMOL/L — SIGNIFICANT CHANGE UP (ref 96–108)
CO2 SERPL-SCNC: 25 MMOL/L — SIGNIFICANT CHANGE UP (ref 22–31)
COVID-19 SPIKE DOMAIN AB INTERP: POSITIVE
COVID-19 SPIKE DOMAIN ANTIBODY RESULT: >250 U/ML — HIGH
CREAT SERPL-MCNC: 0.75 MG/DL — SIGNIFICANT CHANGE UP (ref 0.5–1.3)
EOSINOPHIL # BLD AUTO: 0.02 K/UL — SIGNIFICANT CHANGE UP (ref 0–0.5)
EOSINOPHIL NFR BLD AUTO: 0.2 % — SIGNIFICANT CHANGE UP (ref 0–6)
GLUCOSE BLDC GLUCOMTR-MCNC: 122 MG/DL — HIGH (ref 70–99)
GLUCOSE BLDC GLUCOMTR-MCNC: 190 MG/DL — HIGH (ref 70–99)
GLUCOSE BLDC GLUCOMTR-MCNC: 204 MG/DL — HIGH (ref 70–99)
GLUCOSE BLDC GLUCOMTR-MCNC: 212 MG/DL — HIGH (ref 70–99)
GLUCOSE SERPL-MCNC: 140 MG/DL — HIGH (ref 70–99)
HCT VFR BLD CALC: 34.5 % — LOW (ref 39–50)
HGB BLD-MCNC: 11.1 G/DL — LOW (ref 13–17)
IMM GRANULOCYTES NFR BLD AUTO: 1.1 % — SIGNIFICANT CHANGE UP (ref 0–1.5)
LYMPHOCYTES # BLD AUTO: 15.8 % — SIGNIFICANT CHANGE UP (ref 13–44)
LYMPHOCYTES # BLD AUTO: 2.07 K/UL — SIGNIFICANT CHANGE UP (ref 1–3.3)
MCHC RBC-ENTMCNC: 25.9 PG — LOW (ref 27–34)
MCHC RBC-ENTMCNC: 32.2 GM/DL — SIGNIFICANT CHANGE UP (ref 32–36)
MCV RBC AUTO: 80.4 FL — SIGNIFICANT CHANGE UP (ref 80–100)
MONOCYTES # BLD AUTO: 0.94 K/UL — HIGH (ref 0–0.9)
MONOCYTES NFR BLD AUTO: 7.2 % — SIGNIFICANT CHANGE UP (ref 2–14)
NEUTROPHILS # BLD AUTO: 9.9 K/UL — HIGH (ref 1.8–7.4)
NEUTROPHILS NFR BLD AUTO: 75.5 % — SIGNIFICANT CHANGE UP (ref 43–77)
NRBC # BLD: 0 /100 WBCS — SIGNIFICANT CHANGE UP (ref 0–0)
PLATELET # BLD AUTO: 387 K/UL — SIGNIFICANT CHANGE UP (ref 150–400)
POTASSIUM SERPL-MCNC: 4.3 MMOL/L — SIGNIFICANT CHANGE UP (ref 3.5–5.3)
POTASSIUM SERPL-SCNC: 4.3 MMOL/L — SIGNIFICANT CHANGE UP (ref 3.5–5.3)
RBC # BLD: 4.29 M/UL — SIGNIFICANT CHANGE UP (ref 4.2–5.8)
RBC # FLD: 14.7 % — HIGH (ref 10.3–14.5)
SARS-COV-2 IGG+IGM SERPL QL IA: >250 U/ML — HIGH
SARS-COV-2 IGG+IGM SERPL QL IA: POSITIVE
SODIUM SERPL-SCNC: 134 MMOL/L — LOW (ref 135–145)
WBC # BLD: 13.09 K/UL — HIGH (ref 3.8–10.5)
WBC # FLD AUTO: 13.09 K/UL — HIGH (ref 3.8–10.5)

## 2021-10-17 PROCEDURE — 99232 SBSQ HOSP IP/OBS MODERATE 35: CPT

## 2021-10-17 RX ORDER — OXYCODONE HYDROCHLORIDE 5 MG/1
5 TABLET ORAL EVERY 4 HOURS
Refills: 0 | Status: DISCONTINUED | OUTPATIENT
Start: 2021-10-17 | End: 2021-10-21

## 2021-10-17 RX ORDER — OXYCODONE HYDROCHLORIDE 5 MG/1
10 TABLET ORAL EVERY 4 HOURS
Refills: 0 | Status: DISCONTINUED | OUTPATIENT
Start: 2021-10-17 | End: 2021-10-21

## 2021-10-17 RX ORDER — HYDROMORPHONE HYDROCHLORIDE 2 MG/ML
0.5 INJECTION INTRAMUSCULAR; INTRAVENOUS; SUBCUTANEOUS EVERY 4 HOURS
Refills: 0 | Status: DISCONTINUED | OUTPATIENT
Start: 2021-10-17 | End: 2021-10-21

## 2021-10-17 RX ADMIN — Medication 2: at 12:36

## 2021-10-17 RX ADMIN — OXYCODONE HYDROCHLORIDE 10 MILLIGRAM(S): 5 TABLET ORAL at 14:24

## 2021-10-17 RX ADMIN — LOSARTAN POTASSIUM 100 MILLIGRAM(S): 100 TABLET, FILM COATED ORAL at 05:38

## 2021-10-17 RX ADMIN — SENNA PLUS 2 TABLET(S): 8.6 TABLET ORAL at 21:53

## 2021-10-17 RX ADMIN — Medication 975 MILLIGRAM(S): at 21:53

## 2021-10-17 RX ADMIN — Medication 975 MILLIGRAM(S): at 05:37

## 2021-10-17 RX ADMIN — Medication 25 MILLIGRAM(S): at 05:38

## 2021-10-17 RX ADMIN — Medication 975 MILLIGRAM(S): at 14:24

## 2021-10-17 RX ADMIN — OXYCODONE HYDROCHLORIDE 10 MILLIGRAM(S): 5 TABLET ORAL at 06:07

## 2021-10-17 RX ADMIN — CEFTRIAXONE 100 MILLIGRAM(S): 500 INJECTION, POWDER, FOR SOLUTION INTRAMUSCULAR; INTRAVENOUS at 12:34

## 2021-10-17 RX ADMIN — OXYCODONE HYDROCHLORIDE 10 MILLIGRAM(S): 5 TABLET ORAL at 10:50

## 2021-10-17 RX ADMIN — Medication 975 MILLIGRAM(S): at 14:05

## 2021-10-17 RX ADMIN — ATORVASTATIN CALCIUM 20 MILLIGRAM(S): 80 TABLET, FILM COATED ORAL at 21:53

## 2021-10-17 RX ADMIN — OXYCODONE HYDROCHLORIDE 10 MILLIGRAM(S): 5 TABLET ORAL at 19:50

## 2021-10-17 RX ADMIN — Medication 100 MILLIGRAM(S): at 21:53

## 2021-10-17 RX ADMIN — OXYCODONE HYDROCHLORIDE 10 MILLIGRAM(S): 5 TABLET ORAL at 10:13

## 2021-10-17 RX ADMIN — Medication 975 MILLIGRAM(S): at 06:07

## 2021-10-17 RX ADMIN — OXYCODONE HYDROCHLORIDE 10 MILLIGRAM(S): 5 TABLET ORAL at 05:37

## 2021-10-17 RX ADMIN — OXYCODONE HYDROCHLORIDE 10 MILLIGRAM(S): 5 TABLET ORAL at 20:20

## 2021-10-17 RX ADMIN — Medication 2: at 18:19

## 2021-10-17 RX ADMIN — OXYCODONE HYDROCHLORIDE 10 MILLIGRAM(S): 5 TABLET ORAL at 14:05

## 2021-10-17 NOTE — PROGRESS NOTE ADULT - SUBJECTIVE AND OBJECTIVE BOX
Patient is a 51y old  Male who presents with a chief complaint of Dorsalgia  (16 Oct 2021 14:31)      POST OPERATIVE DAY #:  [1 ]   Patient comfortable  On IV Rocephin  No complaints    T(C): 37.6 (10-17-21 @ 03:50), Max: 37.6 (10-17-21 @ 03:50)  HR: 92 (10-17-21 @ 03:50) (72 - 99)  BP: 146/87 (10-17-21 @ 05:40) (113/62 - 147/76)  RR: 17 (10-17-21 @ 03:50) (16 - 18)  SpO2: 95% (10-17-21 @ 03:50) (94% - 98%)    HMV (R): 1700 cc / 8-hrs  HMV (L):  75 cc / 8 hrs    PHYSICAL EXAM:  NAD, Alert  Back: Dressing C/D/I;   HMV: x 2 in tact   sensation slighlty diminished B feet (pre-op)    (+) Distal Pulses;   No Calf tenderness B/L, PAS                  x[ ] Lower extremity                           PF          DF         EHL       FHL                                                                                            R        5/5        5/5        5/5       5/5                                                        L         5/5        5/5        5/5       5/5      LABS:                        10.6   11.86 )-----------( 337      ( 16 Oct 2021 22:33 )             32.4     10-16    133<L>  |  97  |  15  ----------------------------<  204<H>  4.3   |  24  |  0.85    Ca    8.5      16 Oct 2021 22:33    TPro  7.6  /  Alb  3.8  /  TBili  0.7  /  DBili  x   /  AST  30  /  ALT  63<H>  /  AlkPhos  131<H>  10-16      Culture - Blood (10.15.21 @ 13:37)    -  Streptococcus agalactiae (Group B): Detec    OR Cx: TESTING

## 2021-10-17 NOTE — PHYSICAL THERAPY INITIAL EVALUATION ADULT - DID THE PATIENT HAVE SURGERY?
T7-T12 laminectomy, irrigation & debridement. Phlegmon present over cord T7-T12 which was debrided and evacuated/yes

## 2021-10-17 NOTE — PHYSICAL THERAPY INITIAL EVALUATION ADULT - GENERAL OBSERVATIONS, REHAB EVAL
Pt s/p T7-12 lami with I&D and phlegmon removal on 10/16. Spinal precautions reviewed and maintained t/o session. Pt received semi-supine in bed in NAD, VSS, A&Ox4, +2 hemovacs, +vences, +IVL.

## 2021-10-17 NOTE — PROGRESS NOTE ADULT - SUBJECTIVE AND OBJECTIVE BOX
Ortho post-op check    Pt seen and examined at bedside, resting comfortably. Tolerated procedure well without complications. Denies numbness/tingling, chest pain, SOB.    T(C): 36.5 (10-17-21 @ 00:15), Max: 37.4 (10-16-21 @ 04:23)  HR: 80 (10-17-21 @ 00:15) (61 - 99)  BP: 129/74 (10-17-21 @ 00:15) (113/62 - 147/76)  RR: 16 (10-17-21 @ 00:15) (16 - 18)  SpO2: 96% (10-17-21 @ 00:15) (95% - 98%)                          10.6   11.86 )-----------( 337      ( 16 Oct 2021 22:33 )             32.4     16 Oct 2021 22:33    133    |  97     |  15     ----------------------------<  204    4.3     |  24     |  0.85     Ca    8.5        16 Oct 2021 22:33    TPro  7.6    /  Alb  3.8    /  TBili  0.7    /  DBili  x      /  AST  30     /  ALT  63     /  AlkPhos  131    16 Oct 2021 06:17      Physical Exam:  Gen: NAD    Spine Exam:  Dressing CDI  2 hemovac drains in place    Motor:               C5           C6            C7               C8           T1   R         5/5          5/5            5/5             5/5          5/5  L          5/5          5/5            5/5             5/5          5/5                L2             L3             L4               L5            S1  R         5/5           5/5          5/5             5/5           5/5  L          5/5          5/5           5/5             5/5           5/5    Sensory:            C5         C6         C7      C8       T1        (0=absent, 1=impaired, 2=normal, NT=not testable)  R         2            2           2        2         2  L          2            2           2        2         2               L2          L3         L4      L5       S1         (0=absent, 1=impaired, 2=normal, NT=not testable)  R         2            2            2        2        2  L          2            2           2        2         2      A/P: 51yMale s/p T7-12 laminectomy, I&D POD0    Tolerated procedure well without complications    - Follow up OR cxs  - Monitor drain output  - Pain control  - PT/OT  - WBAT  - DVT prophylaxis - SCDs  - Abx - rocephin - further recs from ID  - Encourage incentive spirometry, deep breathing exercises  - Will discuss with attending, Dr. Marcelo and advise if plan changes    Ky Self, DO  PGY-3, Orthopaedic Surgery

## 2021-10-17 NOTE — PHYSICAL THERAPY INITIAL EVALUATION ADULT - LIVES WITH, PROFILE
Pt lives [place] spouse and daughter in [place]STE and 1 flight +railing indoors to bedroom/children/spouse

## 2021-10-17 NOTE — PHYSICAL THERAPY INITIAL EVALUATION ADULT - ADDITIONAL COMMENTS
PTA pt was independent with all functional mobility, ambulated independently without a device. Pt owns no DME.,

## 2021-10-17 NOTE — PHYSICAL THERAPY INITIAL EVALUATION ADULT - BALANCE DISTURBANCE, IDENTIFIED IMPAIRMENT CONTRIBUTE, REHAB EVAL
Patient identified on census report in collaboration with [facility] and patient's primary insurance carrier.  After chart reviewed, patient was determined not suitable for outreach at this time.  If a need arises that meets outreach criteria, would be happy to work with the patient in the future.      Naren Murray RN, BSN  [facility]  Nurse Navigator - [facility]  Telephone:  [phone]  E-Mail: [email or web address]  
pain/decreased ROM/decreased strength

## 2021-10-17 NOTE — OCCUPATIONAL THERAPY INITIAL EVALUATION ADULT - LIVES WITH, PROFILE
Pt lives with spouse and daughter in [place] and 1 flight indoors to bedroom. Pt reports independence with self care and fxnl mobility PTA without AD.

## 2021-10-17 NOTE — PROGRESS NOTE ADULT - SUBJECTIVE AND OBJECTIVE BOX
Patient is a 51y old  Male who presents with a chief complaint of Dorsalgia  (16 Oct 2021 14:31)      SUBJECTIVE / OVERNIGHT EVENTS: Comfortable without new complaints.   Review of Systems  chest pain no  palpitations no  sob no  nausea no  headache no    MEDICATIONS  (STANDING):  acetaminophen   Tablet .. 975 milliGRAM(s) Oral every 8 hours  atorvastatin 20 milliGRAM(s) Oral at bedtime  cefTRIAXone   IVPB 1000 milliGRAM(s) IV Intermittent every 24 hours  dextrose 40% Gel 15 Gram(s) Oral once  dextrose 5%. 1000 milliLiter(s) (50 mL/Hr) IV Continuous <Continuous>  dextrose 5%. 1000 milliLiter(s) (100 mL/Hr) IV Continuous <Continuous>  dextrose 50% Injectable 25 Gram(s) IV Push once  dextrose 50% Injectable 12.5 Gram(s) IV Push once  dextrose 50% Injectable 25 Gram(s) IV Push once  glucagon  Injectable 1 milliGRAM(s) IntraMuscular once  hydrochlorothiazide 25 milliGRAM(s) Oral daily  insulin lispro (ADMELOG) corrective regimen sliding scale   SubCutaneous three times a day before meals  insulin lispro (ADMELOG) corrective regimen sliding scale   SubCutaneous at bedtime  lactated ringers. 1000 milliLiter(s) (50 mL/Hr) IV Continuous <Continuous>  losartan 100 milliGRAM(s) Oral daily  metoprolol succinate  milliGRAM(s) Oral daily  senna 2 Tablet(s) Oral at bedtime    MEDICATIONS  (PRN):  cyclobenzaprine 10 milliGRAM(s) Oral every 8 hours PRN Muscle Spasm  HYDROmorphone  Injectable 0.5 milliGRAM(s) IV Push every 4 hours PRN Severe Pain (7 - 10)  ondansetron Injectable 4 milliGRAM(s) IV Push every 6 hours PRN Nausea  oxyCODONE    IR 5 milliGRAM(s) Oral every 4 hours PRN Mild Pain (1 - 3)  oxyCODONE    IR 10 milliGRAM(s) Oral every 4 hours PRN Moderate Pain (4 - 6)      Vital Signs Last 24 Hrs  T(C): 37.6 (17 Oct 2021 14:06), Max: 37.6 (17 Oct 2021 03:50)  T(F): 99.6 (17 Oct 2021 14:06), Max: 99.7 (17 Oct 2021 03:50)  HR: 99 (17 Oct 2021 14:06) (72 - 99)  BP: 108/70 (17 Oct 2021 14:06) (108/70 - 146/87)  BP(mean): 96 (17 Oct 2021 00:15) (81 - 104)  RR: 18 (17 Oct 2021 14:06) (16 - 18)  SpO2: 96% (17 Oct 2021 14:06) (94% - 98%)    PHYSICAL EXAM:  GENERAL: NAD, well-developed  HEAD:  Atraumatic, Normocephalic  EYES: EOMI, PERRLA, conjunctiva and sclera clear  NECK: Supple, No JVD  CHEST/LUNG: Clear to auscultation bilaterally; No wheeze  HEART: Regular rate and rhythm; No murmurs, rubs, or gallops  ABDOMEN: Soft, Nontender, Nondistended; Bowel sounds present Drain in place  EXTREMITIES:  2+ Peripheral Pulses, No clubbing, cyanosis, or edema  PSYCH: AAOx3  NEUROLOGY: non-focal  SKIN: No rashes or lesions    LABS:                        11.1   13.09 )-----------( 387      ( 17 Oct 2021 07:45 )             34.5     10-17    134<L>  |  97  |  14  ----------------------------<  140<H>  4.3   |  25  |  0.75    Ca    8.8      17 Oct 2021 07:45    TPro  7.6  /  Alb  3.8  /  TBili  0.7  /  DBili  x   /  AST  30  /  ALT  63<H>  /  AlkPhos  131<H>  10-16    PT/INR - ( 16 Oct 2021 13:08 )   PT: 14.1 sec;   INR: 1.18 ratio         PTT - ( 16 Oct 2021 13:08 )  PTT:29.9 sec          Culture - Urine (collected 15 Oct 2021 15:12)  Source: Clean Catch Clean Catch (Midstream)  Final Report (16 Oct 2021 10:43):    <10,000 CFU/mL Normal Urogenital Morenita    Culture - Blood (collected 15 Oct 2021 13:37)  Source: .Blood Blood-Peripheral  Preliminary Report (16 Oct 2021 14:01):    No growth to date.    Culture - Blood (collected 15 Oct 2021 13:37)  Source: .Blood Blood-Peripheral  Gram Stain (16 Oct 2021 08:16):    Growth in anaerobic bottle: Gram positive cocci in pairs  Preliminary Report (17 Oct 2021 11:05):    Growth in anaerobic bottle: Streptococcus agalactiae (Group B)    Susceptibility to follow.    ***Blood Panel PCR results on this specimen are available    approximately 3 hours after the Gram stain result.***    Gram stain, PCR, and/or culture results maynot always    correspond due to difference in methodologies.    ************************************************************    This PCR assay was performed by multiplex PCR. This    Assay tests for 66 bacterial and resistance gene targets.    Please refer to the [facility] test directory    at [email or web address] for details.  Organism: Blood Culture PCR (16 Oct 2021 09:58)  Organism: Blood Culture PCR (16 Oct 2021 09:58)        RADIOLOGY & ADDITIONAL TESTS:    Imaging Personally Reviewed:    Consultant(s) Notes Reviewed:      Care Discussed with Consultants/Other Providers:

## 2021-10-17 NOTE — PROGRESS NOTE ADULT - SUBJECTIVE AND OBJECTIVE BOX
JUNIOR POMPA 51y [id number]    Patient is a 51y old  Male who presents with a chief complaint of Dorsalgia  (16 Oct 2021 14:31)      Follow Up/CC:  ID following for bacteremia    Interval History/ROS: no fever, s/p laminectomy 10/16    Allergies    latex (Unknown)  No Known Drug Allergies  Seafood (Rash)  shellfish (Hives; Rash)    Intolerances        ANTIMICROBIALS:  cefTRIAXone   IVPB 1000 every 24 hours      MEDICATIONS  (STANDING):  acetaminophen   Tablet .. 975 milliGRAM(s) Oral every 8 hours  atorvastatin 20 milliGRAM(s) Oral at bedtime  cefTRIAXone   IVPB 1000 milliGRAM(s) IV Intermittent every 24 hours  dextrose 40% Gel 15 Gram(s) Oral once  dextrose 5%. 1000 milliLiter(s) (50 mL/Hr) IV Continuous <Continuous>  dextrose 5%. 1000 milliLiter(s) (100 mL/Hr) IV Continuous <Continuous>  dextrose 50% Injectable 25 Gram(s) IV Push once  dextrose 50% Injectable 12.5 Gram(s) IV Push once  dextrose 50% Injectable 25 Gram(s) IV Push once  glucagon  Injectable 1 milliGRAM(s) IntraMuscular once  hydrochlorothiazide 25 milliGRAM(s) Oral daily  insulin lispro (ADMELOG) corrective regimen sliding scale   SubCutaneous three times a day before meals  insulin lispro (ADMELOG) corrective regimen sliding scale   SubCutaneous at bedtime  lactated ringers. 1000 milliLiter(s) (50 mL/Hr) IV Continuous <Continuous>  losartan 100 milliGRAM(s) Oral daily  metoprolol succinate  milliGRAM(s) Oral daily  senna 2 Tablet(s) Oral at bedtime    MEDICATIONS  (PRN):  cyclobenzaprine 10 milliGRAM(s) Oral every 8 hours PRN Muscle Spasm  HYDROmorphone  Injectable 0.5 milliGRAM(s) IV Push every 4 hours PRN Severe Pain (7 - 10)  ondansetron Injectable 4 milliGRAM(s) IV Push every 6 hours PRN Nausea  oxyCODONE    IR 5 milliGRAM(s) Oral every 4 hours PRN Mild Pain (1 - 3)  oxyCODONE    IR 10 milliGRAM(s) Oral every 4 hours PRN Moderate Pain (4 - 6)        Vital Signs Last 24 Hrs  T(C): 37.2 (17 Oct 2021 09:15), Max: 37.6 (17 Oct 2021 03:50)  T(F): 98.9 (17 Oct 2021 09:15), Max: 99.7 (17 Oct 2021 03:50)  HR: 76 (17 Oct 2021 09:15) (72 - 99)  BP: 109/72 (17 Oct 2021 09:15) (109/72 - 146/87)  BP(mean): 96 (17 Oct 2021 00:15) (81 - 104)  RR: 18 (17 Oct 2021 09:15) (16 - 18)  SpO2: 96% (17 Oct 2021 09:15) (94% - 98%)    CBC Full  -  ( 17 Oct 2021 07:45 )  WBC Count : 13.09 K/uL  RBC Count : 4.29 M/uL  Hemoglobin : 11.1 g/dL  Hematocrit : 34.5 %  Platelet Count - Automated : 387 K/uL  Mean Cell Volume : 80.4 fl  Mean Cell Hemoglobin : 25.9 pg  Mean Cell Hemoglobin Concentration : 32.2 gm/dL  Auto Neutrophil # : 9.90 K/uL  Auto Lymphocyte # : 2.07 K/uL  Auto Monocyte # : 0.94 K/uL  Auto Eosinophil # : 0.02 K/uL  Auto Basophil # : 0.02 K/uL  Auto Neutrophil % : 75.5 %  Auto Lymphocyte % : 15.8 %  Auto Monocyte % : 7.2 %  Auto Eosinophil % : 0.2 %  Auto Basophil % : 0.2 %    10-17    134<L>  |  97  |  14  ----------------------------<  140<H>  4.3   |  25  |  0.75    Ca    8.8      17 Oct 2021 07:45    TPro  7.6  /  Alb  3.8  /  TBili  0.7  /  DBili  x   /  AST  30  /  ALT  63<H>  /  AlkPhos  131<H>  10-16    LIVER FUNCTIONS - ( 16 Oct 2021 06:17 )  Alb: 3.8 g/dL / Pro: 7.6 g/dL / ALK PHOS: 131 U/L / ALT: 63 U/L / AST: 30 U/L / GGT: x               MICROBIOLOGY:  Clean Catch Clean Catch (Midstream)  10-15-21   <10,000 CFU/mL Normal Urogenital Morenita  --  --      .Blood Blood-Peripheral  10-15-21   Growth in anaerobic bottle: Gram positive cocci in pairs  ***Blood Panel PCR results on this specimen are available  approximately 3 hours after the Gram stain result.***  Gram stain, PCR, and/or culture results may not always  correspond due to difference in methodologies.  ************************************************************  This PCR assay was performed by multiplex PCR. This  Assay tests for 66 bacterial and resistance gene targets.  Please refer to the [facility] test directory  at [email or web address] for details.  --  Blood Culture PCR              v    Rapid RVP Result: Jarrod (10-15 @ 08:37)          RADIOLOGY

## 2021-10-17 NOTE — OCCUPATIONAL THERAPY INITIAL EVALUATION ADULT - PERTINENT HX OF CURRENT PROBLEM, REHAB EVAL
51 year old male with PMH HTN, HLD, DM presents with back pain x 5 days. Pt reports gradual onset, nonradiating, bilateral low back pain, worse with movement. Pt admits to chills, but denies any fevers, chest pain, shortness of breath, abdominal pain, vomiting, diarrhea, dysuria, headache, vision change, numbness, weakness, or rash. Pt admits to occasional dysuria, but denies hematuria, bowel/bladder dysfunction, or saddle anesthesias. Now s/p T7-12 laminectomy, I&D on 10/16, phlegmon removal.

## 2021-10-17 NOTE — PHYSICAL THERAPY INITIAL EVALUATION ADULT - PLANNED THERAPY INTERVENTIONS, PT EVAL
Stair training: GOAL: pt will negotiate 10 steps using 1 handrail independently in 2 weeks/bed mobility training/gait training/transfer training

## 2021-10-17 NOTE — PROGRESS NOTE ADULT - ASSESSMENT
50 y/o M PMHx T2DM and HTN presents with lower back pain x 5 days. MRI suspicious for epidural abscess. Blood culture growing group B strep, unclear source.     fever, leukocytosis, GBS strep bacteremia, thoracolumbar epidural abscess, sepsis  -CTX 2 gm iv q24  -repeat blood cx  -s/p MRI thoracic/cervical spine also   -check HIV test  -DM control  -?source - no s/s of UTI/skin or soft tissue infection  -h/o dental work last month - was on amoxicillin post procedure  -check TTE  -appreciate Ortho spine evaluation - s/p laminectomy 10/16  -will need picc and long term iv abx eventually   -monitor temps and wbc    Geoff Marroquin  Attending Physician   Division of Infectious Disease  Pager #[phone]  Available on Microsoft Teams also  After 5pm/weekend or no response, call #[phone]

## 2021-10-17 NOTE — PROGRESS NOTE ADULT - ASSESSMENT
51 m with    Back pain/ s/p Epidural abscess drainage  - antibiotic  - pain control  - Ortho spine follow  - wound care  - ID follow    Diabetes  - ADA diet  - BS control    HTN  - control    DVT prophylaxis  - ROBERT Correia MD pager [phone]

## 2021-10-17 NOTE — PHYSICAL THERAPY INITIAL EVALUATION ADULT - PERTINENT HX OF CURRENT PROBLEM, REHAB EVAL
51 year old female with PMH HTN, HLD, DM presents with back pain x 5 days. Pt reports gradual onset, nonradiating, bilateral low back pain, worse with movement. Pt admits to chills, but denies any fevers, chest pain, shortness of breath, abdominal pain, vomiting, diarrhea, dysuria, headache, vision change, numbness, weakness, or rash. Pt admits to occasional dysuria, but denies hematuria, bowel/bladder dysfunction, or saddle anesthesias. Now s/p T7-12 laminectomy, I&D on 10/16, phlegmon removal.

## 2021-10-17 NOTE — PROGRESS NOTE ADULT - ASSESSMENT
Assessment: T7-12 laminectomy;  I&D; Removal of phlegmon      Plan:   Continue ABX  Monitor  HMV output       ** uncertain as to why the discrepancy in drain output     F/U OR Cx    -- ID following  WBAT  ck labs: CBC BMP Rpt EST/CRP       Will follow w/ you

## 2021-10-18 LAB
-  CEFTRIAXONE: SIGNIFICANT CHANGE UP
-  CLINDAMYCIN: SIGNIFICANT CHANGE UP
-  LEVOFLOXACIN: SIGNIFICANT CHANGE UP
-  PENICILLIN: SIGNIFICANT CHANGE UP
-  VANCOMYCIN: SIGNIFICANT CHANGE UP
ANION GAP SERPL CALC-SCNC: 13 MMOL/L — SIGNIFICANT CHANGE UP (ref 5–17)
BASOPHILS # BLD AUTO: 0.03 K/UL — SIGNIFICANT CHANGE UP (ref 0–0.2)
BASOPHILS NFR BLD AUTO: 0.2 % — SIGNIFICANT CHANGE UP (ref 0–2)
BUN SERPL-MCNC: 13 MG/DL — SIGNIFICANT CHANGE UP (ref 7–23)
CALCIUM SERPL-MCNC: 8.7 MG/DL — SIGNIFICANT CHANGE UP (ref 8.4–10.5)
CHLORIDE SERPL-SCNC: 92 MMOL/L — LOW (ref 96–108)
CO2 SERPL-SCNC: 24 MMOL/L — SIGNIFICANT CHANGE UP (ref 22–31)
CREAT SERPL-MCNC: 0.95 MG/DL — SIGNIFICANT CHANGE UP (ref 0.5–1.3)
CULTURE RESULTS: SIGNIFICANT CHANGE UP
EOSINOPHIL # BLD AUTO: 0.15 K/UL — SIGNIFICANT CHANGE UP (ref 0–0.5)
EOSINOPHIL NFR BLD AUTO: 0.9 % — SIGNIFICANT CHANGE UP (ref 0–6)
GLUCOSE BLDC GLUCOMTR-MCNC: 150 MG/DL — HIGH (ref 70–99)
GLUCOSE BLDC GLUCOMTR-MCNC: 164 MG/DL — HIGH (ref 70–99)
GLUCOSE BLDC GLUCOMTR-MCNC: 179 MG/DL — HIGH (ref 70–99)
GLUCOSE BLDC GLUCOMTR-MCNC: 249 MG/DL — HIGH (ref 70–99)
GLUCOSE SERPL-MCNC: 142 MG/DL — HIGH (ref 70–99)
HCT VFR BLD CALC: 28.5 % — LOW (ref 39–50)
HCT VFR BLD CALC: 29.1 % — LOW (ref 39–50)
HGB BLD-MCNC: 9.3 G/DL — LOW (ref 13–17)
HGB BLD-MCNC: 9.4 G/DL — LOW (ref 13–17)
HIV 1+2 AB+HIV1 P24 AG SERPL QL IA: SIGNIFICANT CHANGE UP
IMM GRANULOCYTES NFR BLD AUTO: 0.9 % — SIGNIFICANT CHANGE UP (ref 0–1.5)
LYMPHOCYTES # BLD AUTO: 15.5 % — SIGNIFICANT CHANGE UP (ref 13–44)
LYMPHOCYTES # BLD AUTO: 2.66 K/UL — SIGNIFICANT CHANGE UP (ref 1–3.3)
MCHC RBC-ENTMCNC: 25.4 PG — LOW (ref 27–34)
MCHC RBC-ENTMCNC: 26.2 PG — LOW (ref 27–34)
MCHC RBC-ENTMCNC: 32 GM/DL — SIGNIFICANT CHANGE UP (ref 32–36)
MCHC RBC-ENTMCNC: 33 GM/DL — SIGNIFICANT CHANGE UP (ref 32–36)
MCV RBC AUTO: 79.4 FL — LOW (ref 80–100)
MCV RBC AUTO: 79.5 FL — LOW (ref 80–100)
METHOD TYPE: SIGNIFICANT CHANGE UP
METHOD TYPE: SIGNIFICANT CHANGE UP
MONOCYTES # BLD AUTO: 1.28 K/UL — HIGH (ref 0–0.9)
MONOCYTES NFR BLD AUTO: 7.5 % — SIGNIFICANT CHANGE UP (ref 2–14)
NEUTROPHILS # BLD AUTO: 12.9 K/UL — HIGH (ref 1.8–7.4)
NEUTROPHILS NFR BLD AUTO: 75 % — SIGNIFICANT CHANGE UP (ref 43–77)
NRBC # BLD: 0 /100 WBCS — SIGNIFICANT CHANGE UP (ref 0–0)
NRBC # BLD: 0 /100 WBCS — SIGNIFICANT CHANGE UP (ref 0–0)
ORGANISM # SPEC MICROSCOPIC CNT: SIGNIFICANT CHANGE UP
PLATELET # BLD AUTO: 408 K/UL — HIGH (ref 150–400)
PLATELET # BLD AUTO: 422 K/UL — HIGH (ref 150–400)
POTASSIUM SERPL-MCNC: 4.3 MMOL/L — SIGNIFICANT CHANGE UP (ref 3.5–5.3)
POTASSIUM SERPL-SCNC: 4.3 MMOL/L — SIGNIFICANT CHANGE UP (ref 3.5–5.3)
RBC # BLD: 3.59 M/UL — LOW (ref 4.2–5.8)
RBC # BLD: 3.66 M/UL — LOW (ref 4.2–5.8)
RBC # FLD: 14.4 % — SIGNIFICANT CHANGE UP (ref 10.3–14.5)
RBC # FLD: 14.5 % — SIGNIFICANT CHANGE UP (ref 10.3–14.5)
SODIUM SERPL-SCNC: 129 MMOL/L — LOW (ref 135–145)
SPECIMEN SOURCE: SIGNIFICANT CHANGE UP
WBC # BLD: 17.17 K/UL — HIGH (ref 3.8–10.5)
WBC # BLD: 17.24 K/UL — HIGH (ref 3.8–10.5)
WBC # FLD AUTO: 17.17 K/UL — HIGH (ref 3.8–10.5)
WBC # FLD AUTO: 17.24 K/UL — HIGH (ref 3.8–10.5)

## 2021-10-18 PROCEDURE — 93306 TTE W/DOPPLER COMPLETE: CPT | Mod: 26

## 2021-10-18 PROCEDURE — 99232 SBSQ HOSP IP/OBS MODERATE 35: CPT

## 2021-10-18 RX ORDER — CEFTRIAXONE 500 MG/1
2000 INJECTION, POWDER, FOR SOLUTION INTRAMUSCULAR; INTRAVENOUS EVERY 24 HOURS
Refills: 0 | Status: DISCONTINUED | OUTPATIENT
Start: 2021-10-18 | End: 2021-10-21

## 2021-10-18 RX ADMIN — CEFTRIAXONE 100 MILLIGRAM(S): 500 INJECTION, POWDER, FOR SOLUTION INTRAMUSCULAR; INTRAVENOUS at 15:02

## 2021-10-18 RX ADMIN — OXYCODONE HYDROCHLORIDE 10 MILLIGRAM(S): 5 TABLET ORAL at 11:06

## 2021-10-18 RX ADMIN — LOSARTAN POTASSIUM 100 MILLIGRAM(S): 100 TABLET, FILM COATED ORAL at 05:11

## 2021-10-18 RX ADMIN — Medication 25 MILLIGRAM(S): at 05:11

## 2021-10-18 RX ADMIN — Medication 975 MILLIGRAM(S): at 21:31

## 2021-10-18 RX ADMIN — OXYCODONE HYDROCHLORIDE 10 MILLIGRAM(S): 5 TABLET ORAL at 16:05

## 2021-10-18 RX ADMIN — Medication 975 MILLIGRAM(S): at 14:53

## 2021-10-18 RX ADMIN — Medication 1: at 12:48

## 2021-10-18 RX ADMIN — Medication 100 MILLIGRAM(S): at 21:31

## 2021-10-18 RX ADMIN — ATORVASTATIN CALCIUM 20 MILLIGRAM(S): 80 TABLET, FILM COATED ORAL at 21:31

## 2021-10-18 RX ADMIN — Medication 1: at 09:09

## 2021-10-18 RX ADMIN — OXYCODONE HYDROCHLORIDE 10 MILLIGRAM(S): 5 TABLET ORAL at 04:42

## 2021-10-18 RX ADMIN — Medication 975 MILLIGRAM(S): at 05:10

## 2021-10-18 RX ADMIN — Medication 975 MILLIGRAM(S): at 16:05

## 2021-10-18 RX ADMIN — Medication 975 MILLIGRAM(S): at 22:00

## 2021-10-18 RX ADMIN — OXYCODONE HYDROCHLORIDE 10 MILLIGRAM(S): 5 TABLET ORAL at 05:10

## 2021-10-18 RX ADMIN — OXYCODONE HYDROCHLORIDE 10 MILLIGRAM(S): 5 TABLET ORAL at 19:59

## 2021-10-18 RX ADMIN — OXYCODONE HYDROCHLORIDE 10 MILLIGRAM(S): 5 TABLET ORAL at 15:10

## 2021-10-18 RX ADMIN — OXYCODONE HYDROCHLORIDE 10 MILLIGRAM(S): 5 TABLET ORAL at 12:26

## 2021-10-18 RX ADMIN — OXYCODONE HYDROCHLORIDE 10 MILLIGRAM(S): 5 TABLET ORAL at 20:29

## 2021-10-18 NOTE — PROGRESS NOTE ADULT - SUBJECTIVE AND OBJECTIVE BOX
Patient is a 51y old  Male who presents with a chief complaint of Spine infection (18 Oct 2021 06:51)      SUBJECTIVE / OVERNIGHT EVENTS: Comfortable without new complaints.   Review of Systems  chest pain no  palpitations no  sob no  nausea no  headache no    MEDICATIONS  (STANDING):  acetaminophen   Tablet .. 975 milliGRAM(s) Oral every 8 hours  atorvastatin 20 milliGRAM(s) Oral at bedtime  cefTRIAXone   IVPB 2000 milliGRAM(s) IV Intermittent every 24 hours  dextrose 40% Gel 15 Gram(s) Oral once  dextrose 5%. 1000 milliLiter(s) (50 mL/Hr) IV Continuous <Continuous>  dextrose 5%. 1000 milliLiter(s) (100 mL/Hr) IV Continuous <Continuous>  dextrose 50% Injectable 25 Gram(s) IV Push once  dextrose 50% Injectable 12.5 Gram(s) IV Push once  dextrose 50% Injectable 25 Gram(s) IV Push once  glucagon  Injectable 1 milliGRAM(s) IntraMuscular once  hydrochlorothiazide 25 milliGRAM(s) Oral daily  insulin lispro (ADMELOG) corrective regimen sliding scale   SubCutaneous three times a day before meals  insulin lispro (ADMELOG) corrective regimen sliding scale   SubCutaneous at bedtime  lactated ringers. 1000 milliLiter(s) (50 mL/Hr) IV Continuous <Continuous>  losartan 100 milliGRAM(s) Oral daily  metoprolol succinate  milliGRAM(s) Oral daily  senna 2 Tablet(s) Oral at bedtime    MEDICATIONS  (PRN):  cyclobenzaprine 10 milliGRAM(s) Oral every 8 hours PRN Muscle Spasm  HYDROmorphone  Injectable 0.5 milliGRAM(s) IV Push every 4 hours PRN Severe Pain (7 - 10)  ondansetron Injectable 4 milliGRAM(s) IV Push every 6 hours PRN Nausea  oxyCODONE    IR 5 milliGRAM(s) Oral every 4 hours PRN Mild Pain (1 - 3)  oxyCODONE    IR 10 milliGRAM(s) Oral every 4 hours PRN Moderate Pain (4 - 6)      Vital Signs Last 24 Hrs  T(C): 37.6 (18 Oct 2021 16:30), Max: 37.7 (17 Oct 2021 20:40)  T(F): 99.7 (18 Oct 2021 16:30), Max: 99.9 (17 Oct 2021 20:40)  HR: 100 (18 Oct 2021 16:30) (81 - 105)  BP: 111/68 (18 Oct 2021 16:30) (110/66 - 130/80)  BP(mean): --  RR: 18 (18 Oct 2021 16:30) (18 - 18)  SpO2: 97% (18 Oct 2021 16:30) (94% - 97%)    PHYSICAL EXAM:  GENERAL: NAD, well-developed  HEAD:  Atraumatic, Normocephalic  EYES: EOMI, PERRLA, conjunctiva and sclera clear  NECK: Supple, No JVD  CHEST/LUNG: Clear to auscultation bilaterally; No wheeze  HEART: Regular rate and rhythm; No murmurs, rubs, or gallops  ABDOMEN: Soft, Nontender, Nondistended; Bowel sounds present Back HV  EXTREMITIES:  2+ Peripheral Pulses, No clubbing, cyanosis, or edema  PSYCH: AAOx3  NEUROLOGY: non-focal  SKIN: No rashes or lesions    LABS:                        9.4    17.24 )-----------( 408      ( 18 Oct 2021 07:41 )             28.5     10-18    129<L>  |  92<L>  |  13  ----------------------------<  142<H>  4.3   |  24  |  0.95    Ca    8.7      18 Oct 2021 07:42                Culture - Surgical Swab (collected 17 Oct 2021 03:20)  Source: .Surgical Swab T7-T8 epidural Mass #3  Preliminary Report (18 Oct 2021 08:02):    No growth to date.    Culture - Surgical Swab (collected 17 Oct 2021 03:20)  Source: .Surgical Swab T7-T12 Epidural Mass #2  Preliminary Report (18 Oct 2021 08:01):    No growth to date.    Culture - Surgical Swab (collected 17 Oct 2021 03:20)  Source: .Surgical Swab T7-T12 Epidural Mass #1  Preliminary Report (18 Oct 2021 08:00):    No growth to date.        RADIOLOGY & ADDITIONAL TESTS:    Imaging Personally Reviewed:    Consultant(s) Notes Reviewed:      Care Discussed with Consultants/Other Providers:

## 2021-10-18 NOTE — PROGRESS NOTE ADULT - ASSESSMENT
51 m with    Back pain/ s/p Epidural abscess drainage  - antibiotic  - pain control  - Ortho spine follow  - wound care  - ID follow    Diabetes  - ADA diet  - BS control    HTN  - control    Anemia  - follow Hct  - iron studies, B12    DVT prophylaxis  - ROBERT Correia MD pager [phone]

## 2021-10-18 NOTE — PROGRESS NOTE ADULT - SUBJECTIVE AND OBJECTIVE BOX
JUNIOR POMPA 51y [id number]    Patient is a 51y old  Male who presents with a chief complaint of Spine infection (18 Oct 2021 06:51)      Follow Up/CC:  ID following for    Interval History/ROS:    Allergies    latex (Unknown)  No Known Drug Allergies  Seafood (Rash)  shellfish (Hives; Rash)    Intolerances        ANTIMICROBIALS:  cefTRIAXone   IVPB 2000 every 24 hours      MEDICATIONS  (STANDING):  acetaminophen   Tablet .. 975 milliGRAM(s) Oral every 8 hours  atorvastatin 20 milliGRAM(s) Oral at bedtime  cefTRIAXone   IVPB 2000 milliGRAM(s) IV Intermittent every 24 hours  dextrose 40% Gel 15 Gram(s) Oral once  dextrose 5%. 1000 milliLiter(s) (50 mL/Hr) IV Continuous <Continuous>  dextrose 5%. 1000 milliLiter(s) (100 mL/Hr) IV Continuous <Continuous>  dextrose 50% Injectable 25 Gram(s) IV Push once  dextrose 50% Injectable 12.5 Gram(s) IV Push once  dextrose 50% Injectable 25 Gram(s) IV Push once  glucagon  Injectable 1 milliGRAM(s) IntraMuscular once  hydrochlorothiazide 25 milliGRAM(s) Oral daily  insulin lispro (ADMELOG) corrective regimen sliding scale   SubCutaneous three times a day before meals  insulin lispro (ADMELOG) corrective regimen sliding scale   SubCutaneous at bedtime  lactated ringers. 1000 milliLiter(s) (50 mL/Hr) IV Continuous <Continuous>  losartan 100 milliGRAM(s) Oral daily  metoprolol succinate  milliGRAM(s) Oral daily  senna 2 Tablet(s) Oral at bedtime    MEDICATIONS  (PRN):  cyclobenzaprine 10 milliGRAM(s) Oral every 8 hours PRN Muscle Spasm  HYDROmorphone  Injectable 0.5 milliGRAM(s) IV Push every 4 hours PRN Severe Pain (7 - 10)  ondansetron Injectable 4 milliGRAM(s) IV Push every 6 hours PRN Nausea  oxyCODONE    IR 5 milliGRAM(s) Oral every 4 hours PRN Mild Pain (1 - 3)  oxyCODONE    IR 10 milliGRAM(s) Oral every 4 hours PRN Moderate Pain (4 - 6)        Vital Signs Last 24 Hrs  T(C): 37 (18 Oct 2021 09:15), Max: 37.7 (17 Oct 2021 20:40)  T(F): 98.6 (18 Oct 2021 09:15), Max: 99.9 (17 Oct 2021 20:40)  HR: 81 (18 Oct 2021 09:15) (81 - 105)  BP: 110/66 (18 Oct 2021 09:15) (105/67 - 130/80)  BP(mean): --  RR: 18 (18 Oct 2021 09:15) (18 - 18)  SpO2: 96% (18 Oct 2021 09:15) (94% - 96%)    CBC Full  -  ( 18 Oct 2021 07:41 )  WBC Count : 17.24 K/uL  RBC Count : 3.59 M/uL  Hemoglobin : 9.4 g/dL  Hematocrit : 28.5 %  Platelet Count - Automated : 408 K/uL  Mean Cell Volume : 79.4 fl  Mean Cell Hemoglobin : 26.2 pg  Mean Cell Hemoglobin Concentration : 33.0 gm/dL  Auto Neutrophil # : x  Auto Lymphocyte # : x  Auto Monocyte # : x  Auto Eosinophil # : x  Auto Basophil # : x  Auto Neutrophil % : x  Auto Lymphocyte % : x  Auto Monocyte % : x  Auto Eosinophil % : x  Auto Basophil % : x    10-18    129<L>  |  92<L>  |  13  ----------------------------<  142<H>  4.3   |  24  |  0.95    Ca    8.7      18 Oct 2021 07:42            MICROBIOLOGY:  .Surgical Swab T7-T12 Epidural Mass #1  10-17-21   No growth to date.  --  --      Clean Catch Clean Catch (Midstream)  10-15-21   <10,000 CFU/mL Normal Urogenital Omrenita  --  --      .Blood Blood-Peripheral  10-15-21   Growth in anaerobic bottle: Streptococcus agalactiae (Group B)  ***Blood Panel PCR results on this specimen are available  approximately 3 hours after the Gram stain result.***  Gram stain, PCR, and/or culture results may not always  correspond dueto difference in methodologies.  ************************************************************  This PCR assay was performed by multiplex PCR. This  Assay tests for 66 bacterial and resistance gene targets.  Please refer to the [facility] test directory  at [email or web address] for details.  --  Blood Culture PCR  Streptococcus agalactiae (Group B)              v    Rapid RVP Result: Jarrod (10-15 @ 08:37)          RADIOLOGY     JUNIOR POMPA 51y [id number]    Patient is a 51y old  Male who presents with a chief complaint of Spine infection (18 Oct 2021 06:51)      Follow Up/CC:  ID following for bacteremia    Interval History/ROS: no fever, pain controlled     Allergies    latex (Unknown)  No Known Drug Allergies  Seafood (Rash)  shellfish (Hives; Rash)    Intolerances        ANTIMICROBIALS:  cefTRIAXone   IVPB 2000 every 24 hours      MEDICATIONS  (STANDING):  acetaminophen   Tablet .. 975 milliGRAM(s) Oral every 8 hours  atorvastatin 20 milliGRAM(s) Oral at bedtime  cefTRIAXone   IVPB 2000 milliGRAM(s) IV Intermittent every 24 hours  dextrose 40% Gel 15 Gram(s) Oral once  dextrose 5%. 1000 milliLiter(s) (50 mL/Hr) IV Continuous <Continuous>  dextrose 5%. 1000 milliLiter(s) (100 mL/Hr) IV Continuous <Continuous>  dextrose 50% Injectable 25 Gram(s) IV Push once  dextrose 50% Injectable 12.5 Gram(s) IV Push once  dextrose 50% Injectable 25 Gram(s) IV Push once  glucagon  Injectable 1 milliGRAM(s) IntraMuscular once  hydrochlorothiazide 25 milliGRAM(s) Oral daily  insulin lispro (ADMELOG) corrective regimen sliding scale   SubCutaneous three times a day before meals  insulin lispro (ADMELOG) corrective regimen sliding scale   SubCutaneous at bedtime  lactated ringers. 1000 milliLiter(s) (50 mL/Hr) IV Continuous <Continuous>  losartan 100 milliGRAM(s) Oral daily  metoprolol succinate  milliGRAM(s) Oral daily  senna 2 Tablet(s) Oral at bedtime    MEDICATIONS  (PRN):  cyclobenzaprine 10 milliGRAM(s) Oral every 8 hours PRN Muscle Spasm  HYDROmorphone  Injectable 0.5 milliGRAM(s) IV Push every 4 hours PRN Severe Pain (7 - 10)  ondansetron Injectable 4 milliGRAM(s) IV Push every 6 hours PRN Nausea  oxyCODONE    IR 5 milliGRAM(s) Oral every 4 hours PRN Mild Pain (1 - 3)  oxyCODONE    IR 10 milliGRAM(s) Oral every 4 hours PRN Moderate Pain (4 - 6)        Vital Signs Last 24 Hrs  T(C): 37 (18 Oct 2021 09:15), Max: 37.7 (17 Oct 2021 20:40)  T(F): 98.6 (18 Oct 2021 09:15), Max: 99.9 (17 Oct 2021 20:40)  HR: 81 (18 Oct 2021 09:15) (81 - 105)  BP: 110/66 (18 Oct 2021 09:15) (105/67 - 130/80)  BP(mean): --  RR: 18 (18 Oct 2021 09:15) (18 - 18)  SpO2: 96% (18 Oct 2021 09:15) (94% - 96%)    CBC Full  -  ( 18 Oct 2021 07:41 )  WBC Count : 17.24 K/uL  RBC Count : 3.59 M/uL  Hemoglobin : 9.4 g/dL  Hematocrit : 28.5 %  Platelet Count - Automated : 408 K/uL  Mean Cell Volume : 79.4 fl  Mean Cell Hemoglobin : 26.2 pg  Mean Cell Hemoglobin Concentration : 33.0 gm/dL  Auto Neutrophil # : x  Auto Lymphocyte # : x  Auto Monocyte # : x  Auto Eosinophil # : x  Auto Basophil # : x  Auto Neutrophil % : x  Auto Lymphocyte % : x  Auto Monocyte % : x  Auto Eosinophil % : x  Auto Basophil % : x    10-18    129<L>  |  92<L>  |  13  ----------------------------<  142<H>  4.3   |  24  |  0.95    Ca    8.7      18 Oct 2021 07:42            MICROBIOLOGY:  .Surgical Swab T7-T12 Epidural Mass #1  10-17-21   No growth to date.  --  --      Clean Catch Clean Catch (Midstream)  10-15-21   <10,000 CFU/mL Normal Urogenital Morenita  --  --      .Blood Blood-Peripheral  10-15-21   Growth in anaerobic bottle: Streptococcus agalactiae (Group B)  ***Blood Panel PCR results on this specimen are available  approximately 3 hours after the Gram stain result.***  Gram stain, PCR, and/or culture results may not always  correspond dueto difference in methodologies.  ************************************************************  This PCR assay was performed by multiplex PCR. This  Assay tests for 66 bacterial and resistance gene targets.  Please refer to the [facility] test directory  at [email or web address] for details.  --  Blood Culture PCR  Streptococcus agalactiae (Group B)      Rapid RVP Result: NotDetec (10-15 @ 08:37)        RADIOLOGY    < from: MR Cervical Spine w/wo IV Cont (10.16.21 @ 14:05) >  Dorsal epidural collection extending from T7 through T12 displacing the cord ventrally and causing mild cord compression with mild cord edema consistent with an infectious epidural phlegmon.    < end of copied text >

## 2021-10-18 NOTE — PROGRESS NOTE ADULT - ASSESSMENT
52 y/o M PMHx T2DM and HTN presents with lower back pain x 5 days. MRI suspicious for epidural abscess. Blood culture growing group B strep, unclear source.     fever, leukocytosis, GBS strep bacteremia, thoracolumbar epidural abscess, sepsis  -CTX 2 gm iv q24  -repeat blood cx  -s/p MRI thoracic/cervical spine also   -check HIV test  -DM control  -?source - no s/s of UTI/skin or soft tissue infection  -h/o dental work last month - was on amoxicillin post procedure  -check TTE  -appreciate Ortho spine evaluation - s/p laminectomy 10/16  -will need picc and long term iv abx eventually   -monitor temps and wbc    Geoff Marroquin  Attending Physician   Division of Infectious Disease  Pager #[phone]  Available on [employer] Teams also  After 5pm/weekend or no response, call #[phone]     52 y/o M PMHx T2DM and HTN presents with lower back pain x 5 days. MRI suspicious for epidural abscess. Blood culture growing group B strep, unclear source.     fever, leukocytosis, GBS strep bacteremia, thoracolumbar epidural abscess, sepsis  -CTX 2 gm iv q24  -repeat blood cx pending   -s/p MRI thoracic/cervical spine also   -check HIV test negative  -DM control  -?source - no s/s of UTI/skin or soft tissue infection  -h/o dental work last month - was on amoxicillin post procedure  -check TTE  -appreciate Ortho spine evaluation - s/p laminectomy 10/16  -will need picc and long term iv abx eventually   -monitor temps and wbc    Geoff Marroquin  Attending Physician   Division of Infectious Disease  Pager #[phone]  Available on [employer] Teams also  After 5pm/weekend or no response, call #[phone]

## 2021-10-18 NOTE — PROGRESS NOTE ADULT - ASSESSMENT
Impression: Stable       Plan:   Continue present treatment                 Out of bed, ambulate as tolerated                 Physical therapy follow up                 Continue to monitor    Lalo Verduzco PA-C  Orthopaedic Surgery  Team pager [phone]  [phone]

## 2021-10-18 NOTE — PROGRESS NOTE ADULT - SUBJECTIVE AND OBJECTIVE BOX
ORTHO  Patient is a 51y old  Male who presents with a chief complaint of Dorsalgia  (16 Oct 2021 14:31)    Pt. resting without complaint    VS-  T(C): 37.6 (10-18-21 @ 04:12), Max: 37.7 (10-17-21 @ 20:40)  HR: 87 (10-18-21 @ 04:12) (76 - 105)  BP: 115/76 (10-18-21 @ 04:12) (105/67 - 130/80)  RR: 18 (10-18-21 @ 04:12) (18 - 18)  SpO2: 94% (10-18-21 @ 04:12) (94% - 96%)  Wt(kg): --    M.S. A&O  Back dressing- C/D/I, Hemovac - self suction R-20, L-55  Neuro-              Motor- (+)Ankle, EHL- 5/5              Sensation- grossly intact to light touch              Calves- soft, nontender- PAS                               11.1   13.09 )-----------( 387      ( 17 Oct 2021 07:45 )             34.5     10-17    134<L>  |  97  |  14  ----------------------------<  140<H>  4.3   |  25  |  0.75    Ca    8.8      17 Oct 2021 07:45

## 2021-10-19 LAB
ANION GAP SERPL CALC-SCNC: 11 MMOL/L — SIGNIFICANT CHANGE UP (ref 5–17)
BASOPHILS # BLD AUTO: 0.04 K/UL — SIGNIFICANT CHANGE UP (ref 0–0.2)
BASOPHILS NFR BLD AUTO: 0.2 % — SIGNIFICANT CHANGE UP (ref 0–2)
BUN SERPL-MCNC: 15 MG/DL — SIGNIFICANT CHANGE UP (ref 7–23)
CALCIUM SERPL-MCNC: 8.7 MG/DL — SIGNIFICANT CHANGE UP (ref 8.4–10.5)
CHLORIDE SERPL-SCNC: 95 MMOL/L — LOW (ref 96–108)
CO2 SERPL-SCNC: 27 MMOL/L — SIGNIFICANT CHANGE UP (ref 22–31)
CREAT SERPL-MCNC: 1 MG/DL — SIGNIFICANT CHANGE UP (ref 0.5–1.3)
EOSINOPHIL # BLD AUTO: 0.18 K/UL — SIGNIFICANT CHANGE UP (ref 0–0.5)
EOSINOPHIL NFR BLD AUTO: 1 % — SIGNIFICANT CHANGE UP (ref 0–6)
FERRITIN SERPL-MCNC: 468 NG/ML — HIGH (ref 30–400)
GLUCOSE BLDC GLUCOMTR-MCNC: 149 MG/DL — HIGH (ref 70–99)
GLUCOSE BLDC GLUCOMTR-MCNC: 194 MG/DL — HIGH (ref 70–99)
GLUCOSE BLDC GLUCOMTR-MCNC: 214 MG/DL — HIGH (ref 70–99)
GLUCOSE BLDC GLUCOMTR-MCNC: 242 MG/DL — HIGH (ref 70–99)
GLUCOSE SERPL-MCNC: 157 MG/DL — HIGH (ref 70–99)
HCT VFR BLD CALC: 29.8 % — LOW (ref 39–50)
HCT VFR BLD CALC: 30.4 % — LOW (ref 39–50)
HGB BLD-MCNC: 9.7 G/DL — LOW (ref 13–17)
HGB BLD-MCNC: 9.7 G/DL — LOW (ref 13–17)
IMM GRANULOCYTES NFR BLD AUTO: 0.9 % — SIGNIFICANT CHANGE UP (ref 0–1.5)
IRON SATN MFR SERPL: 15 UG/DL — LOW (ref 45–165)
IRON SATN MFR SERPL: 8 % — LOW (ref 16–55)
LYMPHOCYTES # BLD AUTO: 20.5 % — SIGNIFICANT CHANGE UP (ref 13–44)
LYMPHOCYTES # BLD AUTO: 3.77 K/UL — HIGH (ref 1–3.3)
MCHC RBC-ENTMCNC: 25.5 PG — LOW (ref 27–34)
MCHC RBC-ENTMCNC: 25.7 PG — LOW (ref 27–34)
MCHC RBC-ENTMCNC: 31.9 GM/DL — LOW (ref 32–36)
MCHC RBC-ENTMCNC: 32.6 GM/DL — SIGNIFICANT CHANGE UP (ref 32–36)
MCV RBC AUTO: 79 FL — LOW (ref 80–100)
MCV RBC AUTO: 79.8 FL — LOW (ref 80–100)
MONOCYTES # BLD AUTO: 1.19 K/UL — HIGH (ref 0–0.9)
MONOCYTES NFR BLD AUTO: 6.5 % — SIGNIFICANT CHANGE UP (ref 2–14)
NEUTROPHILS # BLD AUTO: 13.08 K/UL — HIGH (ref 1.8–7.4)
NEUTROPHILS NFR BLD AUTO: 70.9 % — SIGNIFICANT CHANGE UP (ref 43–77)
NRBC # BLD: 0 /100 WBCS — SIGNIFICANT CHANGE UP (ref 0–0)
NRBC # BLD: 0 /100 WBCS — SIGNIFICANT CHANGE UP (ref 0–0)
PLATELET # BLD AUTO: 434 K/UL — HIGH (ref 150–400)
PLATELET # BLD AUTO: 455 K/UL — HIGH (ref 150–400)
POTASSIUM SERPL-MCNC: 4.3 MMOL/L — SIGNIFICANT CHANGE UP (ref 3.5–5.3)
POTASSIUM SERPL-SCNC: 4.3 MMOL/L — SIGNIFICANT CHANGE UP (ref 3.5–5.3)
RBC # BLD: 3.77 M/UL — LOW (ref 4.2–5.8)
RBC # BLD: 3.81 M/UL — LOW (ref 4.2–5.8)
RBC # FLD: 14.3 % — SIGNIFICANT CHANGE UP (ref 10.3–14.5)
RBC # FLD: 14.4 % — SIGNIFICANT CHANGE UP (ref 10.3–14.5)
SODIUM SERPL-SCNC: 133 MMOL/L — LOW (ref 135–145)
TIBC SERPL-MCNC: 203 UG/DL — LOW (ref 220–430)
UIBC SERPL-MCNC: 188 UG/DL — SIGNIFICANT CHANGE UP (ref 110–370)
VIT B12 SERPL-MCNC: 580 PG/ML — SIGNIFICANT CHANGE UP (ref 232–1245)
WBC # BLD: 18.1 K/UL — HIGH (ref 3.8–10.5)
WBC # BLD: 18.42 K/UL — HIGH (ref 3.8–10.5)
WBC # FLD AUTO: 18.1 K/UL — HIGH (ref 3.8–10.5)
WBC # FLD AUTO: 18.42 K/UL — HIGH (ref 3.8–10.5)

## 2021-10-19 PROCEDURE — 99232 SBSQ HOSP IP/OBS MODERATE 35: CPT

## 2021-10-19 RX ADMIN — OXYCODONE HYDROCHLORIDE 10 MILLIGRAM(S): 5 TABLET ORAL at 13:40

## 2021-10-19 RX ADMIN — Medication 975 MILLIGRAM(S): at 14:46

## 2021-10-19 RX ADMIN — OXYCODONE HYDROCHLORIDE 10 MILLIGRAM(S): 5 TABLET ORAL at 09:20

## 2021-10-19 RX ADMIN — Medication 2: at 13:07

## 2021-10-19 RX ADMIN — OXYCODONE HYDROCHLORIDE 10 MILLIGRAM(S): 5 TABLET ORAL at 18:57

## 2021-10-19 RX ADMIN — Medication 975 MILLIGRAM(S): at 05:05

## 2021-10-19 RX ADMIN — Medication 25 MILLIGRAM(S): at 05:06

## 2021-10-19 RX ADMIN — Medication 100 MILLIGRAM(S): at 21:58

## 2021-10-19 RX ADMIN — OXYCODONE HYDROCHLORIDE 10 MILLIGRAM(S): 5 TABLET ORAL at 03:23

## 2021-10-19 RX ADMIN — LOSARTAN POTASSIUM 100 MILLIGRAM(S): 100 TABLET, FILM COATED ORAL at 05:06

## 2021-10-19 RX ADMIN — Medication 975 MILLIGRAM(S): at 22:27

## 2021-10-19 RX ADMIN — Medication 1: at 18:31

## 2021-10-19 RX ADMIN — Medication 975 MILLIGRAM(S): at 21:57

## 2021-10-19 RX ADMIN — SENNA PLUS 2 TABLET(S): 8.6 TABLET ORAL at 21:58

## 2021-10-19 RX ADMIN — OXYCODONE HYDROCHLORIDE 10 MILLIGRAM(S): 5 TABLET ORAL at 02:53

## 2021-10-19 RX ADMIN — OXYCODONE HYDROCHLORIDE 10 MILLIGRAM(S): 5 TABLET ORAL at 13:06

## 2021-10-19 RX ADMIN — CEFTRIAXONE 100 MILLIGRAM(S): 500 INJECTION, POWDER, FOR SOLUTION INTRAMUSCULAR; INTRAVENOUS at 14:49

## 2021-10-19 RX ADMIN — ATORVASTATIN CALCIUM 20 MILLIGRAM(S): 80 TABLET, FILM COATED ORAL at 21:58

## 2021-10-19 RX ADMIN — SODIUM CHLORIDE 50 MILLILITER(S): 9 INJECTION, SOLUTION INTRAVENOUS at 02:54

## 2021-10-19 RX ADMIN — Medication 975 MILLIGRAM(S): at 05:35

## 2021-10-19 RX ADMIN — OXYCODONE HYDROCHLORIDE 10 MILLIGRAM(S): 5 TABLET ORAL at 18:30

## 2021-10-19 RX ADMIN — OXYCODONE HYDROCHLORIDE 10 MILLIGRAM(S): 5 TABLET ORAL at 08:47

## 2021-10-19 RX ADMIN — Medication 975 MILLIGRAM(S): at 15:15

## 2021-10-19 NOTE — PROGRESS NOTE ADULT - SUBJECTIVE AND OBJECTIVE BOX
Patient is a 51y old  Male who presents with a chief complaint of Spine infection   Patient s/p I&D L3-S1 Laminectomy POD#3  Patient comfortable  No complaints    T(C): 37.6 (10-19-21 @ 04:35), Max: 37.6 (10-18-21 @ 16:30)  HR: 82 (10-19-21 @ 04:35) (81 - 109)  BP: 106/71 (10-19-21 @ 04:35) (106/71 - 131/79)  RR: 18 (10-19-21 @ 04:35) (18 - 18)  SpO2: 96% (10-19-21 @ 04:35) (96% - 97%)    PHYSICAL EXAM:  NAD, Alert  Back: Dressing C/D/I; (+) Distal Pulses; No Calf tenderness B/L, PAS    RT-HMV 10/10cc LT- HMV 10/20cc        [ ] Lower extremeity                  PF          DF         EHL       FHL                                                                                            R        5/5        5/5        5/5       5/5                                                        L         5/5        5/5        5/5       5/5      LABS:                      9.7    18.10 )-----------( 455      ( 19 Oct 2021 06:14 )             30.4   10-18  129<L>  |  92<L>  |  13  ----------------------------<  142<H>  4.3   |  24  |  0.95  Ca    8.7      18 Oct 2021 07:42

## 2021-10-19 NOTE — PROGRESS NOTE ADULT - SUBJECTIVE AND OBJECTIVE BOX
JUNIOR POMPA 51y [id number]    Patient is a 51y old  Male who presents with a chief complaint of T7-12 Epidural abscess (19 Oct 2021 06:30)      Follow Up/CC:  ID following for    Interval History/ROS:    Allergies    latex (Unknown)  No Known Drug Allergies  Seafood (Rash)  shellfish (Hives; Rash)    Intolerances        ANTIMICROBIALS:  cefTRIAXone   IVPB 2000 every 24 hours      MEDICATIONS  (STANDING):  acetaminophen   Tablet .. 975 milliGRAM(s) Oral every 8 hours  atorvastatin 20 milliGRAM(s) Oral at bedtime  cefTRIAXone   IVPB 2000 milliGRAM(s) IV Intermittent every 24 hours  dextrose 40% Gel 15 Gram(s) Oral once  dextrose 5%. 1000 milliLiter(s) (50 mL/Hr) IV Continuous <Continuous>  dextrose 5%. 1000 milliLiter(s) (100 mL/Hr) IV Continuous <Continuous>  dextrose 50% Injectable 25 Gram(s) IV Push once  dextrose 50% Injectable 12.5 Gram(s) IV Push once  dextrose 50% Injectable 25 Gram(s) IV Push once  glucagon  Injectable 1 milliGRAM(s) IntraMuscular once  hydrochlorothiazide 25 milliGRAM(s) Oral daily  insulin lispro (ADMELOG) corrective regimen sliding scale   SubCutaneous three times a day before meals  insulin lispro (ADMELOG) corrective regimen sliding scale   SubCutaneous at bedtime  lactated ringers. 1000 milliLiter(s) (50 mL/Hr) IV Continuous <Continuous>  losartan 100 milliGRAM(s) Oral daily  metoprolol succinate  milliGRAM(s) Oral daily  senna 2 Tablet(s) Oral at bedtime    MEDICATIONS  (PRN):  cyclobenzaprine 10 milliGRAM(s) Oral every 8 hours PRN Muscle Spasm  HYDROmorphone  Injectable 0.5 milliGRAM(s) IV Push every 4 hours PRN Severe Pain (7 - 10)  ondansetron Injectable 4 milliGRAM(s) IV Push every 6 hours PRN Nausea  oxyCODONE    IR 10 milliGRAM(s) Oral every 4 hours PRN Moderate Pain (4 - 6)  oxyCODONE    IR 5 milliGRAM(s) Oral every 4 hours PRN Mild Pain (1 - 3)        Vital Signs Last 24 Hrs  T(C): 37.2 (19 Oct 2021 12:04), Max: 37.6 (18 Oct 2021 16:30)  T(F): 98.9 (19 Oct 2021 12:04), Max: 99.7 (18 Oct 2021 16:30)  HR: 91 (19 Oct 2021 12:04) (82 - 109)  BP: 107/67 (19 Oct 2021 12:04) (96/58 - 131/79)  BP(mean): --  RR: 18 (19 Oct 2021 12:04) (18 - 18)  SpO2: 96% (19 Oct 2021 12:04) (96% - 97%)    CBC Full  -  ( 19 Oct 2021 06:14 )  WBC Count : 18.10 K/uL  RBC Count : 3.81 M/uL  Hemoglobin : 9.7 g/dL  Hematocrit : 30.4 %  Platelet Count - Automated : 455 K/uL  Mean Cell Volume : 79.8 fl  Mean Cell Hemoglobin : 25.5 pg  Mean Cell Hemoglobin Concentration : 31.9 gm/dL  Auto Neutrophil # : 13.08 K/uL  Auto Lymphocyte # : 3.77 K/uL  Auto Monocyte # : 1.19 K/uL  Auto Eosinophil # : 0.18 K/uL  Auto Basophil # : 0.04 K/uL  Auto Neutrophil % : 70.9 %  Auto Lymphocyte % : 20.5 %  Auto Monocyte % : 6.5 %  Auto Eosinophil % : 1.0 %  Auto Basophil % : 0.2 %    10-19    133<L>  |  95<L>  |  15  ----------------------------<  157<H>  4.3   |  27  |  1.00    Ca    8.7      19 Oct 2021 06:14            MICROBIOLOGY:  .Blood Blood-Peripheral  10-17-21   No growth to date.  --  --      .Surgical Swab T7-T12 Epidural Mass #1  10-17-21   No growth to date.  --  --      Clean Catch Clean Catch (Midstream)  10-15-21   <10,000 CFU/mL Normal Urogenital Morenita  --  --      .Blood Blood-Peripheral  10-15-21   Growth in anaerobic bottle: Streptococcus agalactiae (Group B)  ***Blood Panel PCR results on this specimen are available  approximately 3 hours after the Gram stain result.***  Gram stain, PCR, and/or culture results may not always  correspond dueto difference in methodologies.  ************************************************************  This PCR assay was performed by multiplex PCR. This  Assay tests for 66 bacterial and resistance gene targets.  Please refer to the [facility] test directory  at [email or web address] for details.  --  Blood Culture PCR  Streptococcus agalactiae (Group B)              v    Rapid RVP Result: NotDetejud (10-15 @ 08:37)          RADIOLOGY     JUNIOR POMPA 51y [id number]    Patient is a 51y old  Male who presents with a chief complaint of T7-12 Epidural abscess (19 Oct 2021 06:30)      Follow Up/CC:  ID following for bacteremia    Interval History/ROS: no fever, back pain controlled     Allergies    latex (Unknown)  No Known Drug Allergies  Seafood (Rash)  shellfish (Hives; Rash)    Intolerances        ANTIMICROBIALS:  cefTRIAXone   IVPB 2000 every 24 hours      MEDICATIONS  (STANDING):  acetaminophen   Tablet .. 975 milliGRAM(s) Oral every 8 hours  atorvastatin 20 milliGRAM(s) Oral at bedtime  cefTRIAXone   IVPB 2000 milliGRAM(s) IV Intermittent every 24 hours  dextrose 40% Gel 15 Gram(s) Oral once  dextrose 5%. 1000 milliLiter(s) (50 mL/Hr) IV Continuous <Continuous>  dextrose 5%. 1000 milliLiter(s) (100 mL/Hr) IV Continuous <Continuous>  dextrose 50% Injectable 25 Gram(s) IV Push once  dextrose 50% Injectable 12.5 Gram(s) IV Push once  dextrose 50% Injectable 25 Gram(s) IV Push once  glucagon  Injectable 1 milliGRAM(s) IntraMuscular once  hydrochlorothiazide 25 milliGRAM(s) Oral daily  insulin lispro (ADMELOG) corrective regimen sliding scale   SubCutaneous three times a day before meals  insulin lispro (ADMELOG) corrective regimen sliding scale   SubCutaneous at bedtime  lactated ringers. 1000 milliLiter(s) (50 mL/Hr) IV Continuous <Continuous>  losartan 100 milliGRAM(s) Oral daily  metoprolol succinate  milliGRAM(s) Oral daily  senna 2 Tablet(s) Oral at bedtime    MEDICATIONS  (PRN):  cyclobenzaprine 10 milliGRAM(s) Oral every 8 hours PRN Muscle Spasm  HYDROmorphone  Injectable 0.5 milliGRAM(s) IV Push every 4 hours PRN Severe Pain (7 - 10)  ondansetron Injectable 4 milliGRAM(s) IV Push every 6 hours PRN Nausea  oxyCODONE    IR 10 milliGRAM(s) Oral every 4 hours PRN Moderate Pain (4 - 6)  oxyCODONE    IR 5 milliGRAM(s) Oral every 4 hours PRN Mild Pain (1 - 3)        Vital Signs Last 24 Hrs  T(C): 37.2 (19 Oct 2021 12:04), Max: 37.6 (18 Oct 2021 16:30)  T(F): 98.9 (19 Oct 2021 12:04), Max: 99.7 (18 Oct 2021 16:30)  HR: 91 (19 Oct 2021 12:04) (82 - 109)  BP: 107/67 (19 Oct 2021 12:04) (96/58 - 131/79)  BP(mean): --  RR: 18 (19 Oct 2021 12:04) (18 - 18)  SpO2: 96% (19 Oct 2021 12:04) (96% - 97%)    CBC Full  -  ( 19 Oct 2021 06:14 )  WBC Count : 18.10 K/uL  RBC Count : 3.81 M/uL  Hemoglobin : 9.7 g/dL  Hematocrit : 30.4 %  Platelet Count - Automated : 455 K/uL  Mean Cell Volume : 79.8 fl  Mean Cell Hemoglobin : 25.5 pg  Mean Cell Hemoglobin Concentration : 31.9 gm/dL  Auto Neutrophil # : 13.08 K/uL  Auto Lymphocyte # : 3.77 K/uL  Auto Monocyte # : 1.19 K/uL  Auto Eosinophil # : 0.18 K/uL  Auto Basophil # : 0.04 K/uL  Auto Neutrophil % : 70.9 %  Auto Lymphocyte % : 20.5 %  Auto Monocyte % : 6.5 %  Auto Eosinophil % : 1.0 %  Auto Basophil % : 0.2 %    10-19    133<L>  |  95<L>  |  15  ----------------------------<  157<H>  4.3   |  27  |  1.00    Ca    8.7      19 Oct 2021 06:14            MICROBIOLOGY:  .Blood Blood-Peripheral  10-17-21   No growth to date.  --  --      .Surgical Swab T7-T12 Epidural Mass #1  10-17-21   No growth to date.  --  --      Clean Catch Clean Catch (Midstream)  10-15-21   <10,000 CFU/mL Normal Urogenital Morenita  --  --      .Blood Blood-Peripheral  10-15-21   Growth in anaerobic bottle: Streptococcus agalactiae (Group B)  ***Blood Panel PCR results on this specimen are available  approximately 3 hours after the Gram stain result.***  Gram stain, PCR, and/or culture results may not always  correspond dueto difference in methodologies.  ************************************************************  This PCR assay was performed by multiplex PCR. This  Assay tests for 66 bacterial and resistance gene targets.  Please refer to the [facility] test directory  at [email or web address] for details.  --  Blood Culture PCR  Streptococcus agalactiae (Group B)      Rapid RVP Result: NotDetec (10-15 @ 08:37)        RADIOLOGY    < from: MR Cervical Spine w/wo IV Cont (10.16.21 @ 14:05) >  Dorsal epidural collection extending from T7 through T12 displacing the cord ventrally and causing mild cord compression with mild cord edema consistent with an infectious epidural phlegmon.      < from: Transthoracic Echocardiogram (10.18.21 @ 09:13) >  Normal echocardiogram.    < end of copied text >

## 2021-10-19 NOTE — PROGRESS NOTE ADULT - ASSESSMENT
51 y/oM s/p L3-S1 Laminectomy, I&D POD#3, f/u HMVs, ID recs  Sho Bonner PA-C  Orthopaedic Surgery  Team pager [phone]  [facility] [phone]  [phone]

## 2021-10-19 NOTE — PROGRESS NOTE ADULT - ASSESSMENT
52 y/o M PMHx T2DM and HTN presents with lower back pain x 5 days. MRI suspicious for epidural abscess. Blood culture growing group B strep, unclear source.     fever, leukocytosis, GBS strep bacteremia, thoracolumbar epidural abscess, sepsis  -CTX 2 gm iv q24  -repeat blood cx pending   -s/p MRI thoracic/cervical spine also   -check HIV test negative  -DM control  -?source - no s/s of UTI/skin or soft tissue infection  -h/o dental work last month - was on amoxicillin post procedure  -TTE normal  -appreciate Ortho spine evaluation - s/p laminectomy 10/16  -picc line  -potential side effects of PICC explained including bleeding and infection  -monitor temps and wbc  -day 3 of 42 of abx  -potential side effects of abx explained including GI, Cdiff, allergy issues, development of resisitance, etc.  -weekly cbc, bun/creatinine, esr, crp - fax [phone]  -f/u in ID office @ [phone] in 1 month on DC     Geoff Marroquin  Attending Physician   Division of Infectious Disease  Pager #[phone]  Available on Microsoft Teams also  After 5pm/weekend or no response, call #[phone]

## 2021-10-19 NOTE — PROGRESS NOTE ADULT - ASSESSMENT
51 m with    Back pain/ s/p Epidural abscess drainage  - antibiotic  - pain control  - Ortho spine follow  - wound care  - ID follow    Diabetes  - ADA diet  - BS control    HTN  - control    Anemia  - follow Hct  - iron studies, B12    DVT prophylaxis  - ROBERT Correia MD pager [phone]  51 m with    Back pain/ s/p Epidural abscess drainage  - antibiotic  - pain control  - Ortho spine follow  - wound care  - ID follow    Diabetes  - ADA diet  - BS control    HTN  - control    Anemia of chronic disease  - follow Hct    DVT prophylaxis  - ROBERT Correia MD pager [phone]

## 2021-10-19 NOTE — PROGRESS NOTE ADULT - SUBJECTIVE AND OBJECTIVE BOX
Patient is a 51y old  Male who presents with a chief complaint of T7-12 Epidural abscess (19 Oct 2021 06:30)      SUBJECTIVE / OVERNIGHT EVENTS: Comfortable without new complaints.   Review of Systems  chest pain no  palpitations no  sob no  nausea no  headache no    MEDICATIONS  (STANDING):  acetaminophen   Tablet .. 975 milliGRAM(s) Oral every 8 hours  atorvastatin 20 milliGRAM(s) Oral at bedtime  cefTRIAXone   IVPB 2000 milliGRAM(s) IV Intermittent every 24 hours  dextrose 40% Gel 15 Gram(s) Oral once  dextrose 5%. 1000 milliLiter(s) (50 mL/Hr) IV Continuous <Continuous>  dextrose 5%. 1000 milliLiter(s) (100 mL/Hr) IV Continuous <Continuous>  dextrose 50% Injectable 25 Gram(s) IV Push once  dextrose 50% Injectable 12.5 Gram(s) IV Push once  dextrose 50% Injectable 25 Gram(s) IV Push once  glucagon  Injectable 1 milliGRAM(s) IntraMuscular once  hydrochlorothiazide 25 milliGRAM(s) Oral daily  insulin lispro (ADMELOG) corrective regimen sliding scale   SubCutaneous three times a day before meals  insulin lispro (ADMELOG) corrective regimen sliding scale   SubCutaneous at bedtime  lactated ringers. 1000 milliLiter(s) (50 mL/Hr) IV Continuous <Continuous>  losartan 100 milliGRAM(s) Oral daily  metoprolol succinate  milliGRAM(s) Oral daily  senna 2 Tablet(s) Oral at bedtime    MEDICATIONS  (PRN):  cyclobenzaprine 10 milliGRAM(s) Oral every 8 hours PRN Muscle Spasm  HYDROmorphone  Injectable 0.5 milliGRAM(s) IV Push every 4 hours PRN Severe Pain (7 - 10)  ondansetron Injectable 4 milliGRAM(s) IV Push every 6 hours PRN Nausea  oxyCODONE    IR 5 milliGRAM(s) Oral every 4 hours PRN Mild Pain (1 - 3)  oxyCODONE    IR 10 milliGRAM(s) Oral every 4 hours PRN Moderate Pain (4 - 6)      Vital Signs Last 24 Hrs  T(C): 37.2 (19 Oct 2021 16:43), Max: 37.6 (19 Oct 2021 00:19)  T(F): 98.9 (19 Oct 2021 16:43), Max: 99.7 (19 Oct 2021 00:19)  HR: 93 (19 Oct 2021 16:43) (82 - 109)  BP: 104/66 (19 Oct 2021 16:43) (96/58 - 131/79)  BP(mean): --  RR: 18 (19 Oct 2021 16:43) (18 - 18)  SpO2: 97% (19 Oct 2021 16:43) (96% - 97%)    PHYSICAL EXAM:  GENERAL: NAD, well-developed  HEAD:  Atraumatic, Normocephalic  EYES: EOMI, PERRLA, conjunctiva and sclera clear  NECK: Supple, No JVD  CHEST/LUNG: Clear to auscultation bilaterally; No wheeze  HEART: Regular rate and rhythm; No murmurs, rubs, or gallops  ABDOMEN: Soft, Nontender, Nondistended; Bowel sounds present HV X2   EXTREMITIES:  2+ Peripheral Pulses, No clubbing, cyanosis, or edema  PSYCH: AAOx3  NEUROLOGY: non-focal  SKIN: No rashes or lesions    LABS:                        9.7    18.10 )-----------( 455      ( 19 Oct 2021 06:14 )             30.4     10-19    133<L>  |  95<L>  |  15  ----------------------------<  157<H>  4.3   |  27  |  1.00    Ca    8.7      19 Oct 2021 06:14                Culture - Blood (collected 17 Oct 2021 18:17)  Source: .Blood Blood-Peripheral  Preliminary Report (18 Oct 2021 19:01):    No growth to date.    Culture - Blood (collected 17 Oct 2021 18:17)  Source: .Blood Blood-Peripheral  Preliminary Report (18 Oct 2021 19:01):    No growth to date.    Culture - Surgical Swab (collected 17 Oct 2021 03:20)  Source: .Surgical Swab T7-T8 epidural Mass #3  Preliminary Report (19 Oct 2021 10:52):    Rare Streptococcus agalactiae (Group B) Streptococcus agalactiae (Group    B) isolated    Group B streptococci are susceptible to ampicillin,    penicillin and cefazolin, but may be resistant to    erythromycin and clindamycin.    Recommendations for intrapartum prophylaxis for Group B    streptococci are penicillin or ampicillin.    Culture - Surgical Swab (collected 17 Oct 2021 03:20)  Source: .Surgical Swab T7-T12 Epidural Mass #2  Preliminary Report (19 Oct 2021 10:54):    Rare Streptococcus agalactiae (Group B) Streptococcus agalactiae (Group    B) isolated    Group B streptococci are susceptible to ampicillin,    penicillin and cefazolin, but may be resistant to    erythromycin and clindamycin.    Recommendations for intrapartum prophylaxis for Group B    streptococci are penicillin or ampicillin.    Culture - Surgical Swab (collected 17 Oct 2021 03:20)  Source: .Surgical Swab T7-T12 Epidural Mass #1  Preliminary Report (18 Oct 2021 08:00):    No growth to date.        RADIOLOGY & ADDITIONAL TESTS:    Imaging Personally Reviewed:    Consultant(s) Notes Reviewed:      Care Discussed with Consultants/Other Providers:

## 2021-10-20 LAB
ANION GAP SERPL CALC-SCNC: 12 MMOL/L — SIGNIFICANT CHANGE UP (ref 5–17)
BUN SERPL-MCNC: 13 MG/DL — SIGNIFICANT CHANGE UP (ref 7–23)
CALCIUM SERPL-MCNC: 9.2 MG/DL — SIGNIFICANT CHANGE UP (ref 8.4–10.5)
CHLORIDE SERPL-SCNC: 94 MMOL/L — LOW (ref 96–108)
CO2 SERPL-SCNC: 26 MMOL/L — SIGNIFICANT CHANGE UP (ref 22–31)
CREAT SERPL-MCNC: 0.98 MG/DL — SIGNIFICANT CHANGE UP (ref 0.5–1.3)
CULTURE RESULTS: SIGNIFICANT CHANGE UP
GLUCOSE BLDC GLUCOMTR-MCNC: 184 MG/DL — HIGH (ref 70–99)
GLUCOSE BLDC GLUCOMTR-MCNC: 216 MG/DL — HIGH (ref 70–99)
GLUCOSE BLDC GLUCOMTR-MCNC: 226 MG/DL — HIGH (ref 70–99)
GLUCOSE BLDC GLUCOMTR-MCNC: 227 MG/DL — HIGH (ref 70–99)
GLUCOSE SERPL-MCNC: 142 MG/DL — HIGH (ref 70–99)
HCT VFR BLD CALC: 30 % — LOW (ref 39–50)
HGB BLD-MCNC: 9.7 G/DL — LOW (ref 13–17)
MCHC RBC-ENTMCNC: 25.9 PG — LOW (ref 27–34)
MCHC RBC-ENTMCNC: 32.3 GM/DL — SIGNIFICANT CHANGE UP (ref 32–36)
MCV RBC AUTO: 80.2 FL — SIGNIFICANT CHANGE UP (ref 80–100)
NRBC # BLD: 0 /100 WBCS — SIGNIFICANT CHANGE UP (ref 0–0)
PLATELET # BLD AUTO: 495 K/UL — HIGH (ref 150–400)
POTASSIUM SERPL-MCNC: 4.4 MMOL/L — SIGNIFICANT CHANGE UP (ref 3.5–5.3)
POTASSIUM SERPL-SCNC: 4.4 MMOL/L — SIGNIFICANT CHANGE UP (ref 3.5–5.3)
RBC # BLD: 3.74 M/UL — LOW (ref 4.2–5.8)
RBC # FLD: 14.3 % — SIGNIFICANT CHANGE UP (ref 10.3–14.5)
SODIUM SERPL-SCNC: 132 MMOL/L — LOW (ref 135–145)
SPECIMEN SOURCE: SIGNIFICANT CHANGE UP
WBC # BLD: 15.13 K/UL — HIGH (ref 3.8–10.5)
WBC # FLD AUTO: 15.13 K/UL — HIGH (ref 3.8–10.5)

## 2021-10-20 PROCEDURE — 99232 SBSQ HOSP IP/OBS MODERATE 35: CPT

## 2021-10-20 PROCEDURE — 71045 X-RAY EXAM CHEST 1 VIEW: CPT | Mod: 26

## 2021-10-20 RX ORDER — CEFTRIAXONE 500 MG/1
2 INJECTION, POWDER, FOR SOLUTION INTRAMUSCULAR; INTRAVENOUS
Qty: 82 | Refills: 0
Start: 2021-10-20 | End: 2021-11-29

## 2021-10-20 RX ADMIN — OXYCODONE HYDROCHLORIDE 10 MILLIGRAM(S): 5 TABLET ORAL at 16:40

## 2021-10-20 RX ADMIN — Medication 975 MILLIGRAM(S): at 05:33

## 2021-10-20 RX ADMIN — ATORVASTATIN CALCIUM 20 MILLIGRAM(S): 80 TABLET, FILM COATED ORAL at 21:40

## 2021-10-20 RX ADMIN — Medication 975 MILLIGRAM(S): at 16:10

## 2021-10-20 RX ADMIN — OXYCODONE HYDROCHLORIDE 10 MILLIGRAM(S): 5 TABLET ORAL at 06:21

## 2021-10-20 RX ADMIN — CEFTRIAXONE 100 MILLIGRAM(S): 500 INJECTION, POWDER, FOR SOLUTION INTRAMUSCULAR; INTRAVENOUS at 16:11

## 2021-10-20 RX ADMIN — Medication 2: at 17:37

## 2021-10-20 RX ADMIN — OXYCODONE HYDROCHLORIDE 10 MILLIGRAM(S): 5 TABLET ORAL at 01:09

## 2021-10-20 RX ADMIN — Medication 975 MILLIGRAM(S): at 16:40

## 2021-10-20 RX ADMIN — Medication 1: at 12:14

## 2021-10-20 RX ADMIN — OXYCODONE HYDROCHLORIDE 10 MILLIGRAM(S): 5 TABLET ORAL at 12:43

## 2021-10-20 RX ADMIN — Medication 100 MILLIGRAM(S): at 21:40

## 2021-10-20 RX ADMIN — OXYCODONE HYDROCHLORIDE 10 MILLIGRAM(S): 5 TABLET ORAL at 12:13

## 2021-10-20 RX ADMIN — Medication 2: at 08:32

## 2021-10-20 RX ADMIN — LOSARTAN POTASSIUM 100 MILLIGRAM(S): 100 TABLET, FILM COATED ORAL at 05:33

## 2021-10-20 RX ADMIN — OXYCODONE HYDROCHLORIDE 10 MILLIGRAM(S): 5 TABLET ORAL at 06:52

## 2021-10-20 RX ADMIN — Medication 975 MILLIGRAM(S): at 06:03

## 2021-10-20 RX ADMIN — Medication 975 MILLIGRAM(S): at 21:40

## 2021-10-20 RX ADMIN — SENNA PLUS 2 TABLET(S): 8.6 TABLET ORAL at 21:40

## 2021-10-20 RX ADMIN — Medication 25 MILLIGRAM(S): at 05:33

## 2021-10-20 RX ADMIN — OXYCODONE HYDROCHLORIDE 10 MILLIGRAM(S): 5 TABLET ORAL at 01:39

## 2021-10-20 RX ADMIN — OXYCODONE HYDROCHLORIDE 10 MILLIGRAM(S): 5 TABLET ORAL at 16:10

## 2021-10-20 RX ADMIN — OXYCODONE HYDROCHLORIDE 10 MILLIGRAM(S): 5 TABLET ORAL at 22:10

## 2021-10-20 RX ADMIN — Medication 975 MILLIGRAM(S): at 22:10

## 2021-10-20 RX ADMIN — OXYCODONE HYDROCHLORIDE 10 MILLIGRAM(S): 5 TABLET ORAL at 21:40

## 2021-10-20 NOTE — PROGRESS NOTE ADULT - SUBJECTIVE AND OBJECTIVE BOX
Patient is a 51y old  Male who presents with a chief complaint of Spine infection (20 Oct 2021 06:23)      SUBJECTIVE / OVERNIGHT EVENTS: Comfortable without new complaints.    Review of Systems  chest pain no  palpitations no  sob no  nausea no  headache no    MEDICATIONS  (STANDING):  acetaminophen   Tablet .. 975 milliGRAM(s) Oral every 8 hours  atorvastatin 20 milliGRAM(s) Oral at bedtime  cefTRIAXone   IVPB 2000 milliGRAM(s) IV Intermittent every 24 hours  dextrose 40% Gel 15 Gram(s) Oral once  dextrose 5%. 1000 milliLiter(s) (50 mL/Hr) IV Continuous <Continuous>  dextrose 5%. 1000 milliLiter(s) (100 mL/Hr) IV Continuous <Continuous>  dextrose 50% Injectable 25 Gram(s) IV Push once  dextrose 50% Injectable 12.5 Gram(s) IV Push once  dextrose 50% Injectable 25 Gram(s) IV Push once  glucagon  Injectable 1 milliGRAM(s) IntraMuscular once  hydrochlorothiazide 25 milliGRAM(s) Oral daily  insulin lispro (ADMELOG) corrective regimen sliding scale   SubCutaneous three times a day before meals  insulin lispro (ADMELOG) corrective regimen sliding scale   SubCutaneous at bedtime  lactated ringers. 1000 milliLiter(s) (50 mL/Hr) IV Continuous <Continuous>  losartan 100 milliGRAM(s) Oral daily  metoprolol succinate  milliGRAM(s) Oral daily  senna 2 Tablet(s) Oral at bedtime    MEDICATIONS  (PRN):  cyclobenzaprine 10 milliGRAM(s) Oral every 8 hours PRN Muscle Spasm  HYDROmorphone  Injectable 0.5 milliGRAM(s) IV Push every 4 hours PRN Severe Pain (7 - 10)  ondansetron Injectable 4 milliGRAM(s) IV Push every 6 hours PRN Nausea  oxyCODONE    IR 10 milliGRAM(s) Oral every 4 hours PRN Moderate Pain (4 - 6)  oxyCODONE    IR 5 milliGRAM(s) Oral every 4 hours PRN Mild Pain (1 - 3)      Vital Signs Last 24 Hrs  T(C): 36.9 (20 Oct 2021 16:15), Max: 37.2 (20 Oct 2021 00:24)  T(F): 98.4 (20 Oct 2021 16:15), Max: 99 (20 Oct 2021 00:24)  HR: 100 (20 Oct 2021 16:15) (76 - 104)  BP: 113/74 (20 Oct 2021 16:15) (103/66 - 133/75)  BP(mean): --  RR: 18 (20 Oct 2021 16:15) (18 - 18)  SpO2: 97% (20 Oct 2021 16:15) (95% - 99%)    PHYSICAL EXAM:  GENERAL: NAD, well-developed  HEAD:  Atraumatic, Normocephalic  EYES: EOMI, PERRLA, conjunctiva and sclera clear  NECK: Supple, No JVD  CHEST/LUNG: Clear to auscultation bilaterally; No wheeze HVX2  HEART: Regular rate and rhythm; No murmurs, rubs, or gallops  ABDOMEN: Soft, Nontender, Nondistended; Bowel sounds present  EXTREMITIES:  2+ Peripheral Pulses, No clubbing, cyanosis, or edema  PSYCH: AAOx3  NEUROLOGY: non-focal  SKIN: No rashes or lesions    LABS:                        9.7    15.13 )-----------( 495      ( 20 Oct 2021 07:15 )             30.0     10-20    132<L>  |  94<L>  |  13  ----------------------------<  142<H>  4.4   |  26  |  0.98    Ca    9.2      20 Oct 2021 07:11                  RADIOLOGY & ADDITIONAL TESTS:    Imaging Personally Reviewed:    Consultant(s) Notes Reviewed:      Care Discussed with Consultants/Other Providers:

## 2021-10-20 NOTE — PROGRESS NOTE ADULT - NEGATIVE ENMT SYMPTOMS
no ear pain/no nasal congestion/no dry mouth

## 2021-10-20 NOTE — PROGRESS NOTE ADULT - NEGATIVE GASTROINTESTINAL SYMPTOMS
no vomiting/no diarrhea/no abdominal pain

## 2021-10-20 NOTE — PROGRESS NOTE ADULT - CONSTITUTIONAL DETAILS
well-groomed/no distress
in chair/well-groomed/no distress

## 2021-10-20 NOTE — PROGRESS NOTE ADULT - NSPROGADDITIONALINFOA_GEN_ALL_CORE
D/w Med NP (cSott)
I am away on 10/21 and will return on 10/22. ID coverage available. Call ID office @ [phone] with questions.

## 2021-10-20 NOTE — PROGRESS NOTE ADULT - ASSESSMENT
51 m with    Back pain/ s/p Epidural abscess drainage  - antibiotic   - pain control  - Ortho spine follow  - wound care   - PICC in place  - ID follow     Diabetes  - ADA diet  - BS control    HTN  - control    Anemia of chronic disease  - follow Hct    DVT prophylaxis  - PAS    DCP when cleared by Ortho.    Sukhjinder Correia MD pager [phone]

## 2021-10-20 NOTE — PROGRESS NOTE ADULT - SUBJECTIVE AND OBJECTIVE BOX
JUNIOR POMPA 51y [id number]    Patient is a 51y old  Male who presents with a chief complaint of Spine infection (20 Oct 2021 06:23)      Follow Up/CC:  ID following for GBS bacteremia    Interval History/ROS: no fever, back pain controlled     Allergies    latex (Unknown)  No Known Drug Allergies  Seafood (Rash)  shellfish (Hives; Rash)    Intolerances        ANTIMICROBIALS:  cefTRIAXone   IVPB 2000 every 24 hours      MEDICATIONS  (STANDING):  acetaminophen   Tablet .. 975 milliGRAM(s) Oral every 8 hours  atorvastatin 20 milliGRAM(s) Oral at bedtime  cefTRIAXone   IVPB 2000 milliGRAM(s) IV Intermittent every 24 hours  dextrose 40% Gel 15 Gram(s) Oral once  dextrose 5%. 1000 milliLiter(s) (50 mL/Hr) IV Continuous <Continuous>  dextrose 5%. 1000 milliLiter(s) (100 mL/Hr) IV Continuous <Continuous>  dextrose 50% Injectable 25 Gram(s) IV Push once  dextrose 50% Injectable 12.5 Gram(s) IV Push once  dextrose 50% Injectable 25 Gram(s) IV Push once  glucagon  Injectable 1 milliGRAM(s) IntraMuscular once  hydrochlorothiazide 25 milliGRAM(s) Oral daily  insulin lispro (ADMELOG) corrective regimen sliding scale   SubCutaneous three times a day before meals  insulin lispro (ADMELOG) corrective regimen sliding scale   SubCutaneous at bedtime  lactated ringers. 1000 milliLiter(s) (50 mL/Hr) IV Continuous <Continuous>  losartan 100 milliGRAM(s) Oral daily  metoprolol succinate  milliGRAM(s) Oral daily  senna 2 Tablet(s) Oral at bedtime    MEDICATIONS  (PRN):  cyclobenzaprine 10 milliGRAM(s) Oral every 8 hours PRN Muscle Spasm  HYDROmorphone  Injectable 0.5 milliGRAM(s) IV Push every 4 hours PRN Severe Pain (7 - 10)  ondansetron Injectable 4 milliGRAM(s) IV Push every 6 hours PRN Nausea  oxyCODONE    IR 5 milliGRAM(s) Oral every 4 hours PRN Mild Pain (1 - 3)  oxyCODONE    IR 10 milliGRAM(s) Oral every 4 hours PRN Moderate Pain (4 - 6)        Vital Signs Last 24 Hrs  T(C): 36.9 (20 Oct 2021 13:10), Max: 37.4 (19 Oct 2021 20:25)  T(F): 98.5 (20 Oct 2021 13:10), Max: 99.4 (19 Oct 2021 20:25)  HR: 91 (20 Oct 2021 13:10) (76 - 104)  BP: 103/66 (20 Oct 2021 13:10) (103/66 - 138/83)  BP(mean): --  RR: 18 (20 Oct 2021 13:10) (18 - 18)  SpO2: 99% (20 Oct 2021 13:10) (95% - 99%)    CBC Full  -  ( 20 Oct 2021 07:15 )  WBC Count : 15.13 K/uL  RBC Count : 3.74 M/uL  Hemoglobin : 9.7 g/dL  Hematocrit : 30.0 %  Platelet Count - Automated : 495 K/uL  Mean Cell Volume : 80.2 fl  Mean Cell Hemoglobin : 25.9 pg  Mean Cell Hemoglobin Concentration : 32.3 gm/dL  Auto Neutrophil # : x  Auto Lymphocyte # : x  Auto Monocyte # : x  Auto Eosinophil # : x  Auto Basophil # : x  Auto Neutrophil % : x  Auto Lymphocyte % : x  Auto Monocyte % : x  Auto Eosinophil % : x  Auto Basophil % : x    10-20    132<L>  |  94<L>  |  13  ----------------------------<  142<H>  4.4   |  26  |  0.98    Ca    9.2      20 Oct 2021 07:11            MICROBIOLOGY:  .Blood Blood-Peripheral  10-17-21   No growth to date.  --  --      .Surgical Swab T7-T12 Epidural Mass #1  10-17-21   No growth to date.  --  --      Clean Catch Clean Catch (Midstream)  10-15-21   <10,000 CFU/mL Normal Urogenital Morenita  --  --      .Blood Blood-Peripheral  10-15-21   Growth in anaerobic bottle: Streptococcus agalactiae (Group B)  ***Blood Panel PCR results on this specimen are available  approximately 3 hours after the Gram stain result.***  Gram stain, PCR, and/or culture results may not always  correspond dueto difference in methodologies.  ************************************************************  This PCR assay was performed by multiplex PCR. This  Assay tests for 66 bacterial and resistance gene targets.  Please refer to the [facility] test directory  at [email or web address] for details.  --  Blood Culture PCR  Streptococcus agalactiae (Group B)        Rapid RVP Result: NotDetec (10-15 @ 08:37)          RADIOLOGY    < from: MR Cervical Spine w/wo IV Cont (10.16.21 @ 14:05) >  Dorsal epidural collection extending from T7 through T12 displacing the cord ventrally and causing mild cord compression with mild cord edema consistent with an infectious epidural phlegmon.    < end of copied text >

## 2021-10-20 NOTE — PROGRESS NOTE ADULT - NEUROLOGICAL DETAILS
alert and oriented x 3/responds to verbal commands

## 2021-10-20 NOTE — PROGRESS NOTE ADULT - GASTROINTESTINAL DETAILS
soft/nontender/no distention/no guarding/no rigidity
soft/nontender/no distention/no rebound tenderness/no guarding/no rigidity
soft/nontender/no distention/no rebound tenderness/no guarding/no rigidity
soft/nontender/no distention/no rebound tenderness/no guarding

## 2021-10-20 NOTE — PROGRESS NOTE ADULT - SUBJECTIVE AND OBJECTIVE BOX
ORTHO  Patient is a 51y old  Male who presents with a chief complaint of T7-12 Epidural abscess (19 Oct 2021 06:30)    Pt. resting without complaint    VS-  T(C): 37 (10-20-21 @ 04:24), Max: 37.6 (10-19-21 @ 09:20)  HR: 76 (10-20-21 @ 04:24) (76 - 104)  BP: 133/75 (10-20-21 @ 04:24) (96/58 - 138/83)  RR: 18 (10-20-21 @ 04:24) (18 - 18)  SpO2: 95% (10-20-21 @ 04:24) (95% - 97%)  Wt(kg): --    M.S. A&O  Back dressing- C/D/I, Hemovac - self suction R-0, L-15  Neuro-              Motor- (+)Ankle, EHL- 5/5              Sensation- grossly intact to light touch              Calves- soft, nontender- PAS                               9.7    18.10 )-----------( 455      ( 19 Oct 2021 06:14 )             30.4     10-19    133<L>  |  95<L>  |  15  ----------------------------<  157<H>  4.3   |  27  |  1.00    Ca    8.7      19 Oct 2021 06:14

## 2021-10-20 NOTE — PROGRESS NOTE ADULT - ASSESSMENT
Impression: Stable       Plan:   Continue present treatment                 Out of bed, ambulate as tolerated                 Physical therapy follow up                 Continue to monitor    Lalo Verduzco PA-C  Orthopaedic Surgery  Team pager [phone]  [phone]         Electronic Signatures:  Lalo Verduzco (PA)  (Signed 18-Oct-2021 06:58)  	Authored: Progress Note, Reason for Admission, Subjective and Objective, Assessment and Plan

## 2021-10-20 NOTE — PROGRESS NOTE ADULT - ASSESSMENT
50 y/o M PMHx T2DM and HTN presents with lower back pain x 5 days. MRI suspicious for epidural abscess. Blood culture growing group B strep, unclear source.     fever, leukocytosis, GBS strep bacteremia, thoracolumbar epidural abscess, sepsis  -CTX 2 gm iv q24  -repeat blood cx pending   -s/p MRI thoracic/cervical spine also   -HIV test negative  -DM control  -?source - no s/s of UTI/skin or soft tissue infection  -h/o dental work last month - was on amoxicillin post procedure  -TTE normal  -appreciate Ortho spine evaluation - s/p laminectomy 10/16  -picc line  -potential side effects of PICC explained including bleeding and infection  -monitor temps and wbc  -day 4 of 42 of abx  -potential side effects of abx explained including GI, Cdiff, allergy issues, development of resisitance, etc.  -weekly cbc, bun/creatinine, esr, crp - fax [phone]  -f/u in ID office @ [phone] in 1 month on DC     Geoff Marroquin  Attending Physician   Division of Infectious Disease  Pager #[phone]  Available on Microsoft Teams also  After 5pm/weekend or no response, call #[phone]

## 2021-10-20 NOTE — PROGRESS NOTE ADULT - RS GEN PE MLT RESP DETAILS PC
clear to auscultation bilaterally/no chest wall tenderness/no wheezes
respirations non-labored/clear to auscultation bilaterally/no wheezes

## 2021-10-21 ENCOUNTER — TRANSCRIPTION ENCOUNTER (OUTPATIENT)
Age: 51
End: 2021-10-21

## 2021-10-21 VITALS
DIASTOLIC BLOOD PRESSURE: 65 MMHG | SYSTOLIC BLOOD PRESSURE: 98 MMHG | OXYGEN SATURATION: 98 % | RESPIRATION RATE: 18 BRPM | HEART RATE: 87 BPM | TEMPERATURE: 98 F

## 2021-10-21 LAB
ANION GAP SERPL CALC-SCNC: 12 MMOL/L — SIGNIFICANT CHANGE UP (ref 5–17)
BUN SERPL-MCNC: 19 MG/DL — SIGNIFICANT CHANGE UP (ref 7–23)
CALCIUM SERPL-MCNC: 9.1 MG/DL — SIGNIFICANT CHANGE UP (ref 8.4–10.5)
CHLORIDE SERPL-SCNC: 93 MMOL/L — LOW (ref 96–108)
CO2 SERPL-SCNC: 27 MMOL/L — SIGNIFICANT CHANGE UP (ref 22–31)
CREAT SERPL-MCNC: 1.02 MG/DL — SIGNIFICANT CHANGE UP (ref 0.5–1.3)
GLUCOSE BLDC GLUCOMTR-MCNC: 175 MG/DL — HIGH (ref 70–99)
GLUCOSE BLDC GLUCOMTR-MCNC: 182 MG/DL — HIGH (ref 70–99)
GLUCOSE BLDC GLUCOMTR-MCNC: 213 MG/DL — HIGH (ref 70–99)
GLUCOSE SERPL-MCNC: 149 MG/DL — HIGH (ref 70–99)
HCT VFR BLD CALC: 31.5 % — LOW (ref 39–50)
HGB BLD-MCNC: 9.8 G/DL — LOW (ref 13–17)
MCHC RBC-ENTMCNC: 25.3 PG — LOW (ref 27–34)
MCHC RBC-ENTMCNC: 31.1 GM/DL — LOW (ref 32–36)
MCV RBC AUTO: 81.2 FL — SIGNIFICANT CHANGE UP (ref 80–100)
NRBC # BLD: 0 /100 WBCS — SIGNIFICANT CHANGE UP (ref 0–0)
PLATELET # BLD AUTO: 553 K/UL — HIGH (ref 150–400)
POTASSIUM SERPL-MCNC: 4.3 MMOL/L — SIGNIFICANT CHANGE UP (ref 3.5–5.3)
POTASSIUM SERPL-SCNC: 4.3 MMOL/L — SIGNIFICANT CHANGE UP (ref 3.5–5.3)
RBC # BLD: 3.88 M/UL — LOW (ref 4.2–5.8)
RBC # FLD: 14.4 % — SIGNIFICANT CHANGE UP (ref 10.3–14.5)
SODIUM SERPL-SCNC: 132 MMOL/L — LOW (ref 135–145)
WBC # BLD: 12.47 K/UL — HIGH (ref 3.8–10.5)
WBC # FLD AUTO: 12.47 K/UL — HIGH (ref 3.8–10.5)

## 2021-10-21 PROCEDURE — 82962 GLUCOSE BLOOD TEST: CPT

## 2021-10-21 PROCEDURE — 36569 INSJ PICC 5 YR+ W/O IMAGING: CPT

## 2021-10-21 PROCEDURE — 71045 X-RAY EXAM CHEST 1 VIEW: CPT

## 2021-10-21 PROCEDURE — 85027 COMPLETE CBC AUTOMATED: CPT

## 2021-10-21 PROCEDURE — 82947 ASSAY GLUCOSE BLOOD QUANT: CPT

## 2021-10-21 PROCEDURE — C9399: CPT

## 2021-10-21 PROCEDURE — 87070 CULTURE OTHR SPECIMN AEROBIC: CPT

## 2021-10-21 PROCEDURE — 87077 CULTURE AEROBIC IDENTIFY: CPT

## 2021-10-21 PROCEDURE — 72158 MRI LUMBAR SPINE W/O & W/DYE: CPT | Mod: MA

## 2021-10-21 PROCEDURE — 87389 HIV-1 AG W/HIV-1&-2 AB AG IA: CPT

## 2021-10-21 PROCEDURE — 85610 PROTHROMBIN TIME: CPT

## 2021-10-21 PROCEDURE — 97166 OT EVAL MOD COMPLEX 45 MIN: CPT

## 2021-10-21 PROCEDURE — 93005 ELECTROCARDIOGRAM TRACING: CPT

## 2021-10-21 PROCEDURE — 86900 BLOOD TYPING SEROLOGIC ABO: CPT

## 2021-10-21 PROCEDURE — 82435 ASSAY OF BLOOD CHLORIDE: CPT

## 2021-10-21 PROCEDURE — 96376 TX/PRO/DX INJ SAME DRUG ADON: CPT

## 2021-10-21 PROCEDURE — 85025 COMPLETE CBC W/AUTO DIFF WBC: CPT

## 2021-10-21 PROCEDURE — 36415 COLL VENOUS BLD VENIPUNCTURE: CPT

## 2021-10-21 PROCEDURE — 86140 C-REACTIVE PROTEIN: CPT

## 2021-10-21 PROCEDURE — 85018 HEMOGLOBIN: CPT

## 2021-10-21 PROCEDURE — 82330 ASSAY OF CALCIUM: CPT

## 2021-10-21 PROCEDURE — 87040 BLOOD CULTURE FOR BACTERIA: CPT

## 2021-10-21 PROCEDURE — 84295 ASSAY OF SERUM SODIUM: CPT

## 2021-10-21 PROCEDURE — 80048 BASIC METABOLIC PNL TOTAL CA: CPT

## 2021-10-21 PROCEDURE — 83550 IRON BINDING TEST: CPT

## 2021-10-21 PROCEDURE — C1751: CPT

## 2021-10-21 PROCEDURE — 83605 ASSAY OF LACTIC ACID: CPT

## 2021-10-21 PROCEDURE — C1769: CPT

## 2021-10-21 PROCEDURE — 74176 CT ABD & PELVIS W/O CONTRAST: CPT | Mod: MA

## 2021-10-21 PROCEDURE — 86901 BLOOD TYPING SEROLOGIC RH(D): CPT

## 2021-10-21 PROCEDURE — 85730 THROMBOPLASTIN TIME PARTIAL: CPT

## 2021-10-21 PROCEDURE — 87181 SC STD AGAR DILUTION PER AGT: CPT

## 2021-10-21 PROCEDURE — 87184 SC STD DISK METHOD PER PLATE: CPT

## 2021-10-21 PROCEDURE — 86769 SARS-COV-2 COVID-19 ANTIBODY: CPT

## 2021-10-21 PROCEDURE — C1889: CPT

## 2021-10-21 PROCEDURE — 97161 PT EVAL LOW COMPLEX 20 MIN: CPT

## 2021-10-21 PROCEDURE — 96365 THER/PROPH/DIAG IV INF INIT: CPT

## 2021-10-21 PROCEDURE — G0378: CPT

## 2021-10-21 PROCEDURE — 96375 TX/PRO/DX INJ NEW DRUG ADDON: CPT

## 2021-10-21 PROCEDURE — 81001 URINALYSIS AUTO W/SCOPE: CPT

## 2021-10-21 PROCEDURE — 85014 HEMATOCRIT: CPT

## 2021-10-21 PROCEDURE — A9585: CPT

## 2021-10-21 PROCEDURE — 86850 RBC ANTIBODY SCREEN: CPT

## 2021-10-21 PROCEDURE — 82728 ASSAY OF FERRITIN: CPT

## 2021-10-21 PROCEDURE — 97116 GAIT TRAINING THERAPY: CPT

## 2021-10-21 PROCEDURE — 0225U NFCT DS DNA&RNA 21 SARSCOV2: CPT

## 2021-10-21 PROCEDURE — 82607 VITAMIN B-12: CPT

## 2021-10-21 PROCEDURE — 88305 TISSUE EXAM BY PATHOLOGIST: CPT

## 2021-10-21 PROCEDURE — 99285 EMERGENCY DEPT VISIT HI MDM: CPT | Mod: 25

## 2021-10-21 PROCEDURE — 80053 COMPREHEN METABOLIC PANEL: CPT

## 2021-10-21 PROCEDURE — 87086 URINE CULTURE/COLONY COUNT: CPT

## 2021-10-21 PROCEDURE — 82803 BLOOD GASES ANY COMBINATION: CPT

## 2021-10-21 PROCEDURE — 76000 FLUOROSCOPY <1 HR PHYS/QHP: CPT

## 2021-10-21 PROCEDURE — 84132 ASSAY OF SERUM POTASSIUM: CPT

## 2021-10-21 PROCEDURE — 72157 MRI CHEST SPINE W/O & W/DYE: CPT | Mod: MA

## 2021-10-21 PROCEDURE — 83036 HEMOGLOBIN GLYCOSYLATED A1C: CPT

## 2021-10-21 PROCEDURE — 72156 MRI NECK SPINE W/O & W/DYE: CPT | Mod: MA

## 2021-10-21 PROCEDURE — 87150 DNA/RNA AMPLIFIED PROBE: CPT

## 2021-10-21 PROCEDURE — 93306 TTE W/DOPPLER COMPLETE: CPT

## 2021-10-21 PROCEDURE — 97530 THERAPEUTIC ACTIVITIES: CPT

## 2021-10-21 PROCEDURE — 83540 ASSAY OF IRON: CPT

## 2021-10-21 RX ORDER — CYCLOBENZAPRINE HYDROCHLORIDE 10 MG/1
1 TABLET, FILM COATED ORAL
Qty: 15 | Refills: 0
Start: 2021-10-21 | End: 2021-10-25

## 2021-10-21 RX ORDER — SENNA PLUS 8.6 MG/1
2 TABLET ORAL
Qty: 0 | Refills: 0 | DISCHARGE
Start: 2021-10-21

## 2021-10-21 RX ORDER — LOSARTAN/HYDROCHLOROTHIAZIDE 100MG-25MG
1 TABLET ORAL
Qty: 0 | Refills: 0 | DISCHARGE
Start: 2021-10-21

## 2021-10-21 RX ORDER — ACETAMINOPHEN 500 MG
3 TABLET ORAL
Qty: 0 | Refills: 0 | DISCHARGE
Start: 2021-10-21

## 2021-10-21 RX ORDER — OXYCODONE HYDROCHLORIDE 5 MG/1
1 TABLET ORAL
Qty: 30 | Refills: 0
Start: 2021-10-21 | End: 2021-10-25

## 2021-10-21 RX ORDER — METFORMIN HYDROCHLORIDE 850 MG/1
1 TABLET ORAL
Qty: 0 | Refills: 0 | DISCHARGE
Start: 2021-10-21

## 2021-10-21 RX ADMIN — CEFTRIAXONE 100 MILLIGRAM(S): 500 INJECTION, POWDER, FOR SOLUTION INTRAMUSCULAR; INTRAVENOUS at 14:11

## 2021-10-21 RX ADMIN — OXYCODONE HYDROCHLORIDE 10 MILLIGRAM(S): 5 TABLET ORAL at 12:17

## 2021-10-21 RX ADMIN — OXYCODONE HYDROCHLORIDE 10 MILLIGRAM(S): 5 TABLET ORAL at 06:16

## 2021-10-21 RX ADMIN — OXYCODONE HYDROCHLORIDE 10 MILLIGRAM(S): 5 TABLET ORAL at 12:47

## 2021-10-21 RX ADMIN — Medication 25 MILLIGRAM(S): at 05:47

## 2021-10-21 RX ADMIN — Medication 975 MILLIGRAM(S): at 06:16

## 2021-10-21 RX ADMIN — Medication 2: at 12:09

## 2021-10-21 RX ADMIN — OXYCODONE HYDROCHLORIDE 10 MILLIGRAM(S): 5 TABLET ORAL at 05:46

## 2021-10-21 RX ADMIN — Medication 1: at 09:33

## 2021-10-21 RX ADMIN — LOSARTAN POTASSIUM 100 MILLIGRAM(S): 100 TABLET, FILM COATED ORAL at 05:47

## 2021-10-21 RX ADMIN — Medication 975 MILLIGRAM(S): at 05:47

## 2021-10-21 NOTE — DISCHARGE NOTE NURSING/CASE MANAGEMENT/SOCIAL WORK - NSDCFUADDAPPT_GEN_ALL_CORE_FT
-weekly cbc, bun/creatinine, esr, crp - fax [phone]  -f/u in ID office @ [phone] in 1 month on DC

## 2021-10-21 NOTE — PROGRESS NOTE ADULT - ASSESSMENT
Impression: Stable       Plan:   Continue present treatment                 Out of bed, ambulate as tolerated                 Physical therapy follow up                 Continue to monitor    Lalo Verduzco PA-C  Orthopaedic Surgery  Team pager [phone]  [phone]

## 2021-10-21 NOTE — PROGRESS NOTE ADULT - PROVIDER SPECIALTY LIST ADULT
Internal Medicine
Orthopedics
Infectious Disease
Internal Medicine
Orthopedics
Internal Medicine
Orthopedics
Orthopedics
Infectious Disease

## 2021-10-21 NOTE — DISCHARGE NOTE NURSING/CASE MANAGEMENT/SOCIAL WORK - PATIENT PORTAL LINK FT
You can access the FollowMyHealth Patient Portal offered by [facility] by registering at the following website: [email or web address]. By joining [facility]’s FollowMyHealth portal, you will also be able to view your health information using other applications (apps) compatible with our system.

## 2021-10-21 NOTE — PROGRESS NOTE ADULT - SUBJECTIVE AND OBJECTIVE BOX
Patient is a 51y old  Male who presents with a chief complaint of Spine infection (21 Oct 2021 06:29)      SUBJECTIVE / OVERNIGHT EVENTS: Comfortable without new complaints.   Review of Systems  chest pain no  palpitations no  sob no  nausea no  headache no    MEDICATIONS  (STANDING):  acetaminophen   Tablet .. 975 milliGRAM(s) Oral every 8 hours  atorvastatin 20 milliGRAM(s) Oral at bedtime  cefTRIAXone   IVPB 2000 milliGRAM(s) IV Intermittent every 24 hours  dextrose 40% Gel 15 Gram(s) Oral once  dextrose 5%. 1000 milliLiter(s) (50 mL/Hr) IV Continuous <Continuous>  dextrose 5%. 1000 milliLiter(s) (100 mL/Hr) IV Continuous <Continuous>  dextrose 50% Injectable 25 Gram(s) IV Push once  dextrose 50% Injectable 12.5 Gram(s) IV Push once  dextrose 50% Injectable 25 Gram(s) IV Push once  glucagon  Injectable 1 milliGRAM(s) IntraMuscular once  hydrochlorothiazide 25 milliGRAM(s) Oral daily  insulin lispro (ADMELOG) corrective regimen sliding scale   SubCutaneous three times a day before meals  insulin lispro (ADMELOG) corrective regimen sliding scale   SubCutaneous at bedtime  lactated ringers. 1000 milliLiter(s) (50 mL/Hr) IV Continuous <Continuous>  losartan 100 milliGRAM(s) Oral daily  metoprolol succinate  milliGRAM(s) Oral daily  senna 2 Tablet(s) Oral at bedtime    MEDICATIONS  (PRN):  cyclobenzaprine 10 milliGRAM(s) Oral every 8 hours PRN Muscle Spasm  HYDROmorphone  Injectable 0.5 milliGRAM(s) IV Push every 4 hours PRN Severe Pain (7 - 10)  ondansetron Injectable 4 milliGRAM(s) IV Push every 6 hours PRN Nausea  oxyCODONE    IR 10 milliGRAM(s) Oral every 4 hours PRN Moderate Pain (4 - 6)  oxyCODONE    IR 5 milliGRAM(s) Oral every 4 hours PRN Mild Pain (1 - 3)      Vital Signs Last 24 Hrs  T(C): 36.8 (21 Oct 2021 12:44), Max: 37.7 (20 Oct 2021 20:42)  T(F): 98.3 (21 Oct 2021 12:44), Max: 99.8 (20 Oct 2021 20:42)  HR: 87 (21 Oct 2021 12:44) (75 - 91)  BP: 98/65 (21 Oct 2021 12:44) (95/60 - 107/74)  BP(mean): --  RR: 18 (21 Oct 2021 12:44) (18 - 18)  SpO2: 98% (21 Oct 2021 12:44) (95% - 98%)    PHYSICAL EXAM:  GENERAL: NAD, well-developed  HEAD:  Atraumatic, Normocephalic  EYES: EOMI, PERRLA, conjunctiva and sclera clear  NECK: Supple, No JVD  CHEST/LUNG: Clear to auscultation bilaterally; No wheeze  HEART: Regular rate and rhythm; No murmurs, rubs, or gallops  ABDOMEN: Soft, Nontender, Nondistended; Bowel sounds present Wound with clean dressing  EXTREMITIES:  2+ Peripheral Pulses, No clubbing, cyanosis, or edema  PSYCH: AAOx3  NEUROLOGY: non-focal  SKIN: No rashes or lesions    LABS:                        9.8    12.47 )-----------( 553      ( 21 Oct 2021 06:42 )             31.5     10-21    132<L>  |  93<L>  |  19  ----------------------------<  149<H>  4.3   |  27  |  1.02    Ca    9.1      21 Oct 2021 06:40                  RADIOLOGY & ADDITIONAL TESTS:    Imaging Personally Reviewed:    Consultant(s) Notes Reviewed:      Care Discussed with Consultants/Other Providers:

## 2021-10-21 NOTE — PROGRESS NOTE ADULT - ASSESSMENT
51 m with    Back pain/ s/p Epidural abscess drainage  - antibiotic   - pain control  - Ortho spine follow. Cleared for DC  - wound care   - PICC in place  - ID follow     Diabetes  - ADA diet  - BS control    HTN  - control    Anemia of chronic disease  - follow Hct    DVT prophylaxis  - PAS    DC home. Follow with Ortho/ ID/ PMD in 3-4 days. QA.    Sukhjinder Correia MD pager [phone]

## 2021-10-21 NOTE — PROGRESS NOTE ADULT - SUBJECTIVE AND OBJECTIVE BOX
ORTHO  Patient is a 51y old  Male who presents with a chief complaint of Spine infection (20 Oct 2021 06:23)    Pt. resting without complaint    VS-  T(C): 37.1 (10-21-21 @ 04:53), Max: 37.7 (10-20-21 @ 20:42)  HR: 75 (10-21-21 @ 04:53) (75 - 100)  BP: 107/74 (10-21-21 @ 04:53) (103/66 - 113/74)  RR: 18 (10-21-21 @ 04:53) (18 - 18)  SpO2: 95% (10-21-21 @ 04:53) (95% - 99%)  Wt(kg): --    M.S. A&O  Back dressing- C/D/I, Hemovac - self suction R-0, L-15  Neuro-              Motor- (+)Ankle, EHL- 5/5              Sensation- grossly intact to light touch              Calves- soft, nontender- PAS                               9.7    15.13 )-----------( 495      ( 20 Oct 2021 07:15 )             30.0     10-20    132<L>  |  94<L>  |  13  ----------------------------<  142<H>  4.4   |  26  |  0.98    Ca    9.2      20 Oct 2021 07:11

## 2021-10-22 LAB
CULTURE RESULTS: SIGNIFICANT CHANGE UP
SPECIMEN SOURCE: SIGNIFICANT CHANGE UP
SURGICAL PATHOLOGY STUDY: SIGNIFICANT CHANGE UP

## 2021-10-29 ENCOUNTER — APPOINTMENT (OUTPATIENT)
Dept: ORTHOPEDIC SURGERY | Facility: CLINIC | Age: 51
End: 2021-10-29
Payer: COMMERCIAL

## 2021-10-29 VITALS
WEIGHT: 190 LBS | HEART RATE: 103 BPM | SYSTOLIC BLOOD PRESSURE: 117 MMHG | HEIGHT: 70 IN | OXYGEN SATURATION: 96 % | BODY MASS INDEX: 27.2 KG/M2 | DIASTOLIC BLOOD PRESSURE: 74 MMHG

## 2021-10-29 VITALS — TEMPERATURE: 97 F

## 2021-10-29 PROCEDURE — 99024 POSTOP FOLLOW-UP VISIT: CPT

## 2021-10-29 NOTE — HISTORY OF PRESENT ILLNESS
[de-identified] : Mr. JUNIOR POMPA  is a 51 year old male who presents to the office s/p T7-T11 laminectomy on 10/16/21 secondary to an epidural abscess.  He was seen in the ER and admitted.  Overall, he is doing well.  He has some thoracic surgical pain.  He is taking flexeril and oxycodone as needed.

## 2021-10-29 NOTE — DISCUSSION/SUMMARY
[de-identified] : Overall he is improving.  He will continue with his antibiotics.  Follow-up in 2 weeks for suture removal.

## 2021-10-31 NOTE — CHART NOTE - NSCHARTNOTEFT_GEN_A_CORE
Admitted with Sepsis from epidural abscess  spinal cord edema from epidural abscess significant
Pt to be transferred to [facility] for urgent decompression with Dr. Marcelo. Reddy Marcelo and Masood aware.

## 2021-11-08 ENCOUNTER — NON-APPOINTMENT (OUTPATIENT)
Age: 51
End: 2021-11-08

## 2021-11-12 ENCOUNTER — APPOINTMENT (OUTPATIENT)
Dept: ORTHOPEDIC SURGERY | Facility: CLINIC | Age: 51
End: 2021-11-12
Payer: COMMERCIAL

## 2021-11-12 VITALS
DIASTOLIC BLOOD PRESSURE: 99 MMHG | WEIGHT: 190 LBS | SYSTOLIC BLOOD PRESSURE: 171 MMHG | HEIGHT: 70 IN | HEART RATE: 100 BPM | BODY MASS INDEX: 27.2 KG/M2

## 2021-11-12 PROCEDURE — 99024 POSTOP FOLLOW-UP VISIT: CPT

## 2021-11-12 NOTE — HISTORY OF PRESENT ILLNESS
[de-identified] : Mr. JUNIOR POMPA  is a 51 year old male who presents to the office s/p T7-T11 laminectomy on 10/16/21 secondary to an epidural abscess.  Overall, he is doing well.  He has some thoracic surgical pain.  He is taking flexeril and oxycodone as needed.  He is here for suture removal.

## 2021-11-23 ENCOUNTER — NON-APPOINTMENT (OUTPATIENT)
Age: 51
End: 2021-11-23

## 2021-11-23 ENCOUNTER — APPOINTMENT (OUTPATIENT)
Dept: INFECTIOUS DISEASE | Facility: CLINIC | Age: 51
End: 2021-11-23
Payer: COMMERCIAL

## 2021-11-23 VITALS — HEART RATE: 81 BPM | DIASTOLIC BLOOD PRESSURE: 98 MMHG | SYSTOLIC BLOOD PRESSURE: 174 MMHG

## 2021-11-23 VITALS
RESPIRATION RATE: 16 BRPM | TEMPERATURE: 98.7 F | HEIGHT: 70 IN | DIASTOLIC BLOOD PRESSURE: 104 MMHG | SYSTOLIC BLOOD PRESSURE: 177 MMHG | OXYGEN SATURATION: 100 % | BODY MASS INDEX: 27.2 KG/M2 | HEART RATE: 81 BPM | WEIGHT: 190 LBS

## 2021-11-23 DIAGNOSIS — Z23 ENCOUNTER FOR IMMUNIZATION: ICD-10-CM

## 2021-11-23 DIAGNOSIS — Z09 ENCOUNTER FOR FOLLOW-UP EXAMINATION AFTER COMPLETED TREATMENT FOR CONDITIONS OTHER THAN MALIGNANT NEOPLASM: ICD-10-CM

## 2021-11-23 PROCEDURE — 90686 IIV4 VACC NO PRSV 0.5 ML IM: CPT

## 2021-11-23 PROCEDURE — G0008: CPT

## 2021-11-23 PROCEDURE — 99212 OFFICE O/P EST SF 10 MIN: CPT | Mod: 25

## 2021-11-24 ENCOUNTER — APPOINTMENT (OUTPATIENT)
Dept: INFECTIOUS DISEASE | Facility: CLINIC | Age: 51
End: 2021-11-24

## 2021-11-24 LAB
BASOPHILS # BLD AUTO: 0.07 K/UL
BASOPHILS NFR BLD AUTO: 0.9 %
EOSINOPHIL # BLD AUTO: 0.28 K/UL
EOSINOPHIL NFR BLD AUTO: 3.7 %
HCT VFR BLD CALC: 39.7 %
HGB BLD-MCNC: 12 G/DL
IMM GRANULOCYTES NFR BLD AUTO: 0.3 %
LYMPHOCYTES # BLD AUTO: 2.67 K/UL
LYMPHOCYTES NFR BLD AUTO: 35.2 %
MAN DIFF?: NORMAL
MCHC RBC-ENTMCNC: 26 PG
MCHC RBC-ENTMCNC: 30.2 GM/DL
MCV RBC AUTO: 85.9 FL
MONOCYTES # BLD AUTO: 0.67 K/UL
MONOCYTES NFR BLD AUTO: 8.8 %
NEUTROPHILS # BLD AUTO: 3.87 K/UL
NEUTROPHILS NFR BLD AUTO: 51.1 %
PLATELET # BLD AUTO: 287 K/UL
RBC # BLD: 4.62 M/UL
RBC # FLD: 15.3 %
WBC # FLD AUTO: 7.58 K/UL

## 2021-11-26 LAB
CRP SERPL-MCNC: <3 MG/L
ERYTHROCYTE [SEDIMENTATION RATE] IN BLOOD BY WESTERGREN METHOD: 44 MM/HR

## 2021-11-26 NOTE — ASSESSMENT
[FreeTextEntry1] : 50 y/o male comes for followup visit. He had GBS bacteremia and epidural abscess, s/p laminectomy.\par \par Doing well. Completed 6 weeks abx. \par \par Will check labs below. \par \par Will give him a flu shot.

## 2021-11-26 NOTE — HISTORY OF PRESENT ILLNESS
[FreeTextEntry1] : 52 y/o male comes for visit.\par \par He was at [facility] for GBS bacteremia and epidural abscess, s/p laminectomy. He was DC with picc + IV abx and completed a course of abx. \par \par He feels well and his back is steadily improving. Saw the spine surgery who was happy with his progress. \par \par No issues with prior picc line site. \par \par No GI or GI complaints. No rash.\par \par He continues to do PT for his back.

## 2021-11-26 NOTE — PHYSICAL EXAM
[General Appearance - Alert] : alert [General Appearance - In No Acute Distress] : in no acute distress [Sclera] : the sclera and conjunctiva were normal [PERRL With Normal Accommodation] : pupils were equal in size, round, reactive to light [Extraocular Movements] : extraocular movements were intact [Outer Ear] : the ears and nose were normal in appearance [Oropharynx] : the oropharynx was normal with no thrush [Neck Appearance] : the appearance of the neck was normal [Neck Cervical Mass (___cm)] : no neck mass was observed [Jugular Venous Distention Increased] : there was no jugular-venous distention [Thyroid Diffuse Enlargement] : the thyroid was not enlarged [Auscultation Breath Sounds / Voice Sounds] : lungs were clear to auscultation bilaterally [Heart Sounds] : normal S1 and S2 [Full Pulse] : the pedal pulses are present [Edema] : there was no peripheral edema [Bowel Sounds] : normal bowel sounds [Abdomen Soft] : soft [Abdomen Tenderness] : non-tender [Abdomen Mass (___ Cm)] : no abdominal mass palpated [Costovertebral Angle Tenderness] : no CVA tenderness [Musculoskeletal - Swelling] : no joint swelling [Skin Color & Pigmentation] : normal skin color and pigmentation [] : no rash [FreeTextEntry1] : spine incision healed  [No Focal Deficits] : no focal deficits [Oriented To Time, Place, And Person] : oriented to person, place, and time [Affect] : the affect was normal

## 2021-12-02 ENCOUNTER — NON-APPOINTMENT (OUTPATIENT)
Age: 51
End: 2021-12-02

## 2021-12-22 ENCOUNTER — APPOINTMENT (OUTPATIENT)
Dept: INFECTIOUS DISEASE | Facility: CLINIC | Age: 51
End: 2021-12-22
Payer: COMMERCIAL

## 2021-12-22 ENCOUNTER — APPOINTMENT (OUTPATIENT)
Dept: ORTHOPEDIC SURGERY | Facility: CLINIC | Age: 51
End: 2021-12-22
Payer: COMMERCIAL

## 2021-12-22 VITALS
TEMPERATURE: 98 F | SYSTOLIC BLOOD PRESSURE: 147 MMHG | BODY MASS INDEX: 27.63 KG/M2 | DIASTOLIC BLOOD PRESSURE: 87 MMHG | WEIGHT: 193 LBS | HEIGHT: 70 IN | RESPIRATION RATE: 16 BRPM | OXYGEN SATURATION: 97 % | HEART RATE: 86 BPM

## 2021-12-22 DIAGNOSIS — B95.1 BACTEREMIA: ICD-10-CM

## 2021-12-22 DIAGNOSIS — R78.81 BACTEREMIA: ICD-10-CM

## 2021-12-22 PROCEDURE — 99212 OFFICE O/P EST SF 10 MIN: CPT

## 2021-12-22 PROCEDURE — 99024 POSTOP FOLLOW-UP VISIT: CPT

## 2021-12-22 NOTE — DISCUSSION/SUMMARY
[de-identified] : Overall, he is recovered well.  He is continue with physical therapy.  I will see him back in 3 to 4 weeks time prior to returning to work around January 16.

## 2021-12-22 NOTE — HISTORY OF PRESENT ILLNESS
[de-identified] : Mr. JUNIOR POMPA  is a 51 year old male who presents to the office s/p T7-T11 laminectomy on 10/16/21 secondary to an epidural abscess.  Overall, he is doing well.  He has some thoracic pain with certain movements.

## 2021-12-22 NOTE — ASSESSMENT
[FreeTextEntry1] : 50 y/o male comes for followup visit. He had GBS bacteremia and epidural abscess, s/p laminectomy.\par \par Doing well. Completed 6 weeks abx. \par \par Will check labs below. \par \par

## 2021-12-22 NOTE — HISTORY OF PRESENT ILLNESS
[FreeTextEntry1] : 50 y/o male comes for visit.\par \par He was at [facility] for GBS bacteremia and epidural abscess, s/p laminectomy. He was DC with picc + IV abx and completed a course of abx. \par \par He feels well and his back is steadily improving. Saw the spine surgery who was happy with his progress. \par \par No GI or GI complaints. No rash.\par \par He continues to do PT for his back.

## 2021-12-23 LAB
BASOPHILS # BLD AUTO: 0.05 K/UL
BASOPHILS NFR BLD AUTO: 0.6 %
CRP SERPL-MCNC: <3 MG/L
EOSINOPHIL # BLD AUTO: 0.15 K/UL
EOSINOPHIL NFR BLD AUTO: 1.7 %
ERYTHROCYTE [SEDIMENTATION RATE] IN BLOOD BY WESTERGREN METHOD: 27 MM/HR
HCT VFR BLD CALC: 45 %
HGB BLD-MCNC: 13.7 G/DL
IMM GRANULOCYTES NFR BLD AUTO: 0.3 %
LYMPHOCYTES # BLD AUTO: 2.86 K/UL
LYMPHOCYTES NFR BLD AUTO: 31.9 %
MAN DIFF?: NORMAL
MCHC RBC-ENTMCNC: 25.6 PG
MCHC RBC-ENTMCNC: 30.4 GM/DL
MCV RBC AUTO: 84.1 FL
MONOCYTES # BLD AUTO: 0.76 K/UL
MONOCYTES NFR BLD AUTO: 8.5 %
NEUTROPHILS # BLD AUTO: 5.12 K/UL
NEUTROPHILS NFR BLD AUTO: 57 %
PLATELET # BLD AUTO: 310 K/UL
RBC # BLD: 5.35 M/UL
RBC # FLD: 15 %
WBC # FLD AUTO: 8.97 K/UL

## 2021-12-30 LAB
BACTERIA BLD CULT: NORMAL
BACTERIA BLD CULT: NORMAL

## 2022-01-12 ENCOUNTER — APPOINTMENT (OUTPATIENT)
Dept: ORTHOPEDIC SURGERY | Facility: CLINIC | Age: 52
End: 2022-01-12
Payer: COMMERCIAL

## 2022-01-12 PROCEDURE — 99024 POSTOP FOLLOW-UP VISIT: CPT

## 2022-01-14 NOTE — HISTORY OF PRESENT ILLNESS
[de-identified] : Mr. JUNIOR POMPA  is a 51 year old male who presents to the office s/p T7-T11 laminectomy on 10/16/21 secondary to an epidural abscess.  Overall, he is doing well.  No new complaints.

## 2022-01-14 NOTE — DISCUSSION/SUMMARY
[de-identified] : Overall he is recovered very well.  He will return to work.  Follow-up in 3 months.

## 2022-03-24 ENCOUNTER — APPOINTMENT (OUTPATIENT)
Dept: OTOLARYNGOLOGY | Facility: CLINIC | Age: 52
End: 2022-03-24
Payer: COMMERCIAL

## 2022-03-24 DIAGNOSIS — Z86.39 PERSONAL HISTORY OF OTHER ENDOCRINE, NUTRITIONAL AND METABOLIC DISEASE: ICD-10-CM

## 2022-03-24 DIAGNOSIS — Z86.79 PERSONAL HISTORY OF OTHER DISEASES OF THE CIRCULATORY SYSTEM: ICD-10-CM

## 2022-03-24 DIAGNOSIS — H93.11 TINNITUS, RIGHT EAR: ICD-10-CM

## 2022-03-24 DIAGNOSIS — H91.93 UNSPECIFIED HEARING LOSS, BILATERAL: ICD-10-CM

## 2022-03-24 PROCEDURE — G0268 REMOVAL OF IMPACTED WAX MD: CPT

## 2022-03-24 PROCEDURE — 99024 POSTOP FOLLOW-UP VISIT: CPT

## 2022-03-24 NOTE — PHYSICAL EXAM
[de-identified] : bilateral cerumen impaction [Midline] : trachea located in midline position [Normal] : no rashes

## 2022-03-24 NOTE — PROCEDURE
[FreeTextEntry1] : Bilateral cerumen removal [FreeTextEntry3] : Procedure: Removal of bilateral cerumen with microscopy assistance\par \par Pre-operative diagnosis: Ear pain and hearing loss\par \par Details:\par A speculum was placed into the right external auditory canal and the ear canal and tympanic membrane was viewed under otomicroscopy. Cerumen was present within the canal. This was removed using suction and ear curette. The tympanic membrane was viewed intact. There was no evidence of middle ear effusion. An identical procedure was performed on the left side. The tympanic membrane was intact. There was no evidence of middle ear effusion.\par \par Post-operative diagnosis: bilateral cerumen\par

## 2022-03-24 NOTE — ASSESSMENT
[FreeTextEntry1] : 51 year old male with bilateral clogged ear sensation s/p cerumen removal today with  improvement.

## 2022-03-24 NOTE — HISTORY OF PRESENT ILLNESS
[de-identified] : 51 year old male presents for an initial evaluation for left side hearing loss. States for a month has had hearing loss in left ear and has occasional right tinnitus. States hearing loss occurred 5 years ago, went to ENT and cerumen was removed and he was better after. Patient denies fevers,  otalgia, otorrhea, ear infections, dizziness, vertigo, or headaches related to hearing. States last audio was 5 years ago. \par \par

## 2022-04-13 ENCOUNTER — APPOINTMENT (OUTPATIENT)
Dept: ORTHOPEDIC SURGERY | Facility: CLINIC | Age: 52
End: 2022-04-13
Payer: COMMERCIAL

## 2022-04-13 PROCEDURE — 99213 OFFICE O/P EST LOW 20 MIN: CPT

## 2022-04-13 NOTE — HISTORY OF PRESENT ILLNESS
[de-identified] : Mr. JUNIOR POMPA  is a 51 year old male who presents to the office s/p T7-T11 laminectomy on 10/16/21 secondary to an epidural abscess.  Overall, he is doing well.  He will get some thoracic tightness but stretching will help.

## 2022-04-13 NOTE — DISCUSSION/SUMMARY
[de-identified] : Overall he is recovered well.  He is back to work.  He will follow up in 6 to 7 months time or sooner with any changes or worsening of his symptoms.

## 2022-09-19 NOTE — ED CDU PROVIDER SUBSEQUENT DAY NOTE - NSICDXPASTSURGICALHX_GEN_ALL_CORE_FT
PAST SURGICAL HISTORY:  No significant past surgical history     
69 yo M h/o diverticulitis, colon ca, colovesicular fistula causing chronic diarrhea since July, htn, hld, asthma biba for weakness, hypoglycemia (fs "low") s/p d10 w improved fs and weakness.  Pt states he was on the toilet w diarrhea and felt too weak to get up so lowered himself to the floor and could not move.  Pt denies fever, ha, cp, sob, cough/uri sx, abd pain, n/v, change in diarrhea from baseline (nonbloody), dysuria.  Pt denies h/o dm; pt states no po's today but did eat dinner last pm.

## 2022-10-14 ENCOUNTER — APPOINTMENT (OUTPATIENT)
Dept: ORTHOPEDIC SURGERY | Facility: CLINIC | Age: 52
End: 2022-10-14

## 2022-10-14 VITALS — BODY MASS INDEX: 28.79 KG/M2 | WEIGHT: 190 LBS | HEIGHT: 68 IN

## 2022-10-14 PROCEDURE — 99213 OFFICE O/P EST LOW 20 MIN: CPT

## 2022-10-14 NOTE — DISCUSSION/SUMMARY
[de-identified] : Overall, he is doing very well.  No signs or symptoms of infection.  We will continue his activities.  Follow-up in 6 months.

## 2022-11-13 NOTE — HISTORY OF PRESENT ILLNESS
[de-identified] : Mr. JUNIOR POMPA  is a 51 year old male who presents to the office s/p T7-T11 laminectomy on 10/16/21 secondary to an epidural abscess.  Overall, he is doing well.  No new complaints. 
[FreeTextEntry1] : \par

## 2023-04-14 ENCOUNTER — APPOINTMENT (OUTPATIENT)
Dept: ORTHOPEDIC SURGERY | Facility: CLINIC | Age: 53
End: 2023-04-14
Payer: COMMERCIAL

## 2023-04-14 VITALS — BODY MASS INDEX: 28.79 KG/M2 | HEIGHT: 68 IN | WEIGHT: 190 LBS

## 2023-04-14 PROCEDURE — 99214 OFFICE O/P EST MOD 30 MIN: CPT

## 2023-04-14 NOTE — DISCUSSION/SUMMARY
[de-identified] : Overall, he is recovered well.  He is back to his activities.  He will follow-up with me in 1 years time or sooner with any changes or worsening of his symptoms.

## 2023-04-14 NOTE — HISTORY OF PRESENT ILLNESS
[de-identified] : Mr. JUNIOR POMPA  is a 51 year old male who presents to the office s/p T7-T11 laminectomy on 10/16/21 secondary to an epidural abscess.  Overall, he is doing great.  No complaints.

## 2023-04-28 ENCOUNTER — NON-APPOINTMENT (OUTPATIENT)
Age: 53
End: 2023-04-28

## 2023-04-28 ENCOUNTER — APPOINTMENT (OUTPATIENT)
Dept: OPHTHALMOLOGY | Facility: CLINIC | Age: 53
End: 2023-04-28
Payer: COMMERCIAL

## 2023-04-28 PROCEDURE — 92134 CPTRZ OPH DX IMG PST SGM RTA: CPT

## 2023-04-28 PROCEDURE — 92014 COMPRE OPH EXAM EST PT 1/>: CPT

## 2023-12-22 ENCOUNTER — APPOINTMENT (OUTPATIENT)
Dept: OPHTHALMOLOGY | Facility: CLINIC | Age: 53
End: 2023-12-22
Payer: COMMERCIAL

## 2023-12-22 ENCOUNTER — NON-APPOINTMENT (OUTPATIENT)
Age: 53
End: 2023-12-22

## 2023-12-22 PROCEDURE — 92133 CPTRZD OPH DX IMG PST SGM ON: CPT

## 2023-12-22 PROCEDURE — 92014 COMPRE OPH EXAM EST PT 1/>: CPT

## 2024-04-10 ENCOUNTER — APPOINTMENT (OUTPATIENT)
Dept: ORTHOPEDIC SURGERY | Facility: CLINIC | Age: 54
End: 2024-04-10
Payer: COMMERCIAL

## 2024-04-10 VITALS — WEIGHT: 190 LBS | HEIGHT: 68 IN | BODY MASS INDEX: 28.79 KG/M2

## 2024-04-10 DIAGNOSIS — G06.2 EXTRADURAL AND SUBDURAL ABSCESS, UNSPECIFIED: ICD-10-CM

## 2024-04-10 DIAGNOSIS — M48.04 SPINAL STENOSIS, THORACIC REGION: ICD-10-CM

## 2024-04-10 PROCEDURE — 99214 OFFICE O/P EST MOD 30 MIN: CPT

## 2024-04-10 NOTE — HISTORY OF PRESENT ILLNESS
[de-identified] : Mr. JUNIOR POMPA  is a 51 year old male who presents to the office s/p T7-T11 laminectomy on 10/16/21 secondary to an epidural abscess.  Overall, he is doing great.  No complaints and no restrictions.

## 2024-04-10 NOTE — DISCUSSION/SUMMARY
[de-identified] : Overall, he is recovered well.  No significant complaints.  No signs or symptoms of infection.  He will continue with his activities.  Follow-up in 1 year.

## 2024-06-20 NOTE — PHYSICAL THERAPY INITIAL EVALUATION ADULT - BED MOBILITY TRAINING, PT EVAL
Physical Therapy Visit    Visit Type: Daily Treatment Note  Visit: 2  Referring Provider: Humaira Escobedo*  Medical Diagnosis (from order): M25.552 - Pain of left hip       OBJECTIVE                                                                                                                      Palpation  Left  - Gluteus Medius: hypertonic, muscle spasm and trigger point  - Gluteus Minimus: hypertonic, muscle spasm and trigger point  - Piriformis: hypertonic, muscle spasm, tenderness and trigger point  Joint Play   Hip: Left  - Posterior Hip Capsule: hypomobile  - Inferior Polk (in 90/90): hypomobile  - Lateral Hip Capsule: hypomobile        Treatment     Therapeutic Exercise  Recumbent bike: 5 minutes  Hamstring stretch: 3 x 30 seconds   Single knee to chest: 2 x 30 seconds  Piriformis stretch: 3 x 30 seconds   Hip flexor stretch (Modified eunice): 3 x 30 seconds   Hip adductor (butterfly stretch): 3 x 30 seconds     Next-  Gastroc stretch: 3 x 30 seconds     Manual Therapy   Left hip hip joint mobilizations- standard and mobilization with movement    Soft tissue mobilization and trigger point release (static and with hip rotation) to the left piriformis muscle     Therapeutic Activity  Squats: 2 x 10 reps   Step ups (4 inch step): 2 x 10 reps     Neuromuscular Re-Education  Supine marches: 2 x 10 reps  Bent knee fall out: 2 x 10 reps     Clams: 2 x 10 reps   Bridges: 2 x 10 reps     Hip abductor isometrics (pilates ring): 2 x 10 reps   Hip adductor isometrics (ball squeeze): 2 x 10 reps       Skilled input: verbal instruction/cues, tactile instruction/cues and posture correction    Writer verbally educated and received verbal consent for hand placement, positioning of patient, and techniques to be performed today from patient for therapist position for techniques and hand placement and palpation for techniques as described above and how they are pertinent to the patient's plan of care.  Home Exercise  Program  Access Code: [email or web address]  URL: [email or web address]  Date: 06/14/2024  Prepared by: Kwadwo Luna    Exercises  - Seated Hamstring Stretch  - 1 x daily - 7 x weekly - 3 reps - 30 hold  - Supine Single Knee to Chest Stretch  - 1 x daily - 7 x weekly - 3 reps - 30 hold  - Supine Piriformis Stretch with Foot on Ground  - 1 x daily - 7 x weekly - 3 reps - 30 hold  - Modified Montrell Stretch  - 1 x daily - 7 x weekly - 30 hold  - Bridge  - 1 x daily - 7 x weekly - 3 sets - 10 reps  - Supine March  - 1 x daily - 7 x weekly - 3 sets - 10 reps  - Supine Single Bent Knee Fallout  - 1 x daily - 7 x weekly - 3 sets - 10 reps  - Clamshell  - 1 x daily - 7 x weekly - 3 sets - 10 reps      ASSESSMENT                                                                                                            The patient was able to demonstrate an improvement in quality of motion into left hip flexion with aid of mobilization with movement, but continued to be limited with rotational motion.  The patient had moderate soft tissue restrictions into the left hip external rotators that was able to be addressed with soft tissue mobilization and trigger point release (static and with hip rotation). The patient  was able to show an improvement in quality of  motion with bent knee fall outs after this but continued to note deep (joint pain) at end range.  The patient  was challenged with step ups and squats, but was able to complete step ups without pain, but had mild pain returning to standing with squats.   Education:   - Results of above outlined education: Verbalizes understanding and Demonstrates understanding    PLAN                                                                                                                           Suggestions for next session as indicated: Progress per plan of care       Therapy procedure time and total treatment time can be found documented on the Time Entry  flowsheet     GOAL: Pt will perform all bed mobility activities independently in 2 weeks

## 2024-06-21 ENCOUNTER — APPOINTMENT (OUTPATIENT)
Dept: OPHTHALMOLOGY | Facility: CLINIC | Age: 54
End: 2024-06-21

## 2025-01-20 NOTE — PATIENT PROFILE ADULT - INTERNATIONAL TRAVEL
Patient has rising PSA, unable to obtain MRI prostate.  We have elected to proceed with a transperineal prostate biopsy.  This will be performed under sedation at the main operating room with PAT prior.  Please schedule for 2/21/2025 - My next available date, please contact patient thank you, this is NOT a Uronav fusion case, as he has not had prostate MRI. Will need precision point reps present for case    Ananth San MD     No

## 2025-03-18 ENCOUNTER — NON-APPOINTMENT (OUTPATIENT)
Age: 55
End: 2025-03-18

## 2025-03-18 ENCOUNTER — APPOINTMENT (OUTPATIENT)
Dept: OPHTHALMOLOGY | Facility: CLINIC | Age: 55
End: 2025-03-18
Payer: COMMERCIAL

## 2025-03-18 PROCEDURE — 92083 EXTENDED VISUAL FIELD XM: CPT

## 2025-03-18 PROCEDURE — 92014 COMPRE OPH EXAM EST PT 1/>: CPT

## 2025-03-18 PROCEDURE — 92133 CPTRZD OPH DX IMG PST SGM ON: CPT

## 2025-03-18 PROCEDURE — 76514 ECHO EXAM OF EYE THICKNESS: CPT

## 2025-09-18 ENCOUNTER — APPOINTMENT (OUTPATIENT)
Dept: OPHTHALMOLOGY | Facility: CLINIC | Age: 55
End: 2025-09-18
Payer: COMMERCIAL

## 2025-09-18 ENCOUNTER — NON-APPOINTMENT (OUTPATIENT)
Age: 55
End: 2025-09-18

## 2025-09-18 PROCEDURE — 92012 INTRM OPH EXAM EST PATIENT: CPT

## 2025-09-18 PROCEDURE — 92083 EXTENDED VISUAL FIELD XM: CPT
